# Patient Record
Sex: FEMALE | Race: WHITE | Employment: PART TIME | URBAN - METROPOLITAN AREA
[De-identification: names, ages, dates, MRNs, and addresses within clinical notes are randomized per-mention and may not be internally consistent; named-entity substitution may affect disease eponyms.]

---

## 2017-07-31 ENCOUNTER — HOSPITAL ENCOUNTER (OUTPATIENT)
Dept: WOMENS IMAGING | Age: 62
Discharge: HOME | End: 2017-07-31
Attending: INTERNAL MEDICINE | Admitting: INTERNAL MEDICINE

## 2017-07-31 DIAGNOSIS — Z12.31 VISIT FOR SCREENING MAMMOGRAM: ICD-10-CM

## 2017-09-18 ENCOUNTER — OFFICE VISIT (OUTPATIENT)
Dept: INTERNAL MEDICINE CLINIC | Age: 62
End: 2017-09-18

## 2017-09-18 VITALS
HEIGHT: 65 IN | DIASTOLIC BLOOD PRESSURE: 86 MMHG | HEART RATE: 60 BPM | BODY MASS INDEX: 30.46 KG/M2 | SYSTOLIC BLOOD PRESSURE: 130 MMHG | WEIGHT: 182.8 LBS | RESPIRATION RATE: 16 BRPM

## 2017-09-18 DIAGNOSIS — R73.02 IGT (IMPAIRED GLUCOSE TOLERANCE): Primary | ICD-10-CM

## 2017-09-18 DIAGNOSIS — M79.652 PAIN OF LEFT THIGH: ICD-10-CM

## 2017-09-18 PROCEDURE — 99213 OFFICE O/P EST LOW 20 MIN: CPT | Performed by: INTERNAL MEDICINE

## 2017-09-18 ASSESSMENT — PATIENT HEALTH QUESTIONNAIRE - PHQ9
SUM OF ALL RESPONSES TO PHQ QUESTIONS 1-9: 0
2. FEELING DOWN, DEPRESSED OR HOPELESS: 0
1. LITTLE INTEREST OR PLEASURE IN DOING THINGS: 0
SUM OF ALL RESPONSES TO PHQ9 QUESTIONS 1 & 2: 0

## 2017-09-19 PROBLEM — R73.02 IGT (IMPAIRED GLUCOSE TOLERANCE): Status: ACTIVE | Noted: 2017-09-19

## 2017-09-19 ASSESSMENT — ENCOUNTER SYMPTOMS
RESPIRATORY NEGATIVE: 1
BACK PAIN: 1

## 2018-03-12 LAB
ALBUMIN SERPL-MCNC: 4.2 G/DL
ALP BLD-CCNC: 82 U/L
ALT SERPL-CCNC: 13 U/L
ANION GAP SERPL CALCULATED.3IONS-SCNC: 1.5 MMOL/L
AST SERPL-CCNC: 21 U/L
BASOPHILS ABSOLUTE: NORMAL /ΜL
BASOPHILS RELATIVE PERCENT: NORMAL %
BILIRUB SERPL-MCNC: 0.5 MG/DL (ref 0.1–1.4)
BUN BLDV-MCNC: 10 MG/DL
CALCIUM SERPL-MCNC: 9.5 MG/DL
CHLORIDE BLD-SCNC: 101 MMOL/L
CHOLESTEROL, TOTAL: 166 MG/DL
CHOLESTEROL/HDL RATIO: 3.5
CO2: NORMAL MMOL/L
CREAT SERPL-MCNC: 0.76 MG/DL
EOSINOPHILS ABSOLUTE: NORMAL /ΜL
EOSINOPHILS RELATIVE PERCENT: NORMAL %
GFR CALCULATED: 85
GLUCOSE BLD-MCNC: 82 MG/DL
HCT VFR BLD CALC: 41.6 % (ref 36–46)
HDLC SERPL-MCNC: 48 MG/DL (ref 35–70)
HEMOGLOBIN: 14.1 G/DL (ref 12–16)
IRON: 94
LDL CHOLESTEROL CALCULATED: 95 MG/DL (ref 0–160)
LYMPHOCYTES ABSOLUTE: NORMAL /ΜL
LYMPHOCYTES RELATIVE PERCENT: NORMAL %
MCH RBC QN AUTO: 30.9 PG
MCHC RBC AUTO-ENTMCNC: 33.9 G/DL
MCV RBC AUTO: 91 FL
MONOCYTES ABSOLUTE: NORMAL /ΜL
MONOCYTES RELATIVE PERCENT: NORMAL %
NEUTROPHILS ABSOLUTE: NORMAL /ΜL
NEUTROPHILS RELATIVE PERCENT: NORMAL %
PLATELET # BLD: 227 K/ΜL
PMV BLD AUTO: NORMAL FL
POTASSIUM SERPL-SCNC: 4.7 MMOL/L
RBC # BLD: 4.56 10^6/ΜL
SODIUM BLD-SCNC: 143 MMOL/L
TOTAL PROTEIN: 7
TRIGL SERPL-MCNC: 117 MG/DL
TSH SERPL DL<=0.05 MIU/L-ACNC: 5.95 UIU/ML
URIC ACID: 4.6
VLDLC SERPL CALC-MCNC: 23 MG/DL
WBC # BLD: 4.7 10^3/ML

## 2018-04-23 ENCOUNTER — OFFICE VISIT (OUTPATIENT)
Dept: INTERNAL MEDICINE CLINIC | Age: 63
End: 2018-04-23

## 2018-04-23 VITALS
BODY MASS INDEX: 29.12 KG/M2 | HEART RATE: 54 BPM | DIASTOLIC BLOOD PRESSURE: 70 MMHG | HEIGHT: 65 IN | RESPIRATION RATE: 16 BRPM | SYSTOLIC BLOOD PRESSURE: 132 MMHG | OXYGEN SATURATION: 98 % | WEIGHT: 174.8 LBS

## 2018-04-23 DIAGNOSIS — Z12.11 COLON CANCER SCREENING: Primary | ICD-10-CM

## 2018-04-23 DIAGNOSIS — M54.32 SCIATICA, LEFT SIDE: ICD-10-CM

## 2018-04-23 PROCEDURE — 99213 OFFICE O/P EST LOW 20 MIN: CPT | Performed by: INTERNAL MEDICINE

## 2018-04-23 ASSESSMENT — PATIENT HEALTH QUESTIONNAIRE - PHQ9
2. FEELING DOWN, DEPRESSED OR HOPELESS: 0
1. LITTLE INTEREST OR PLEASURE IN DOING THINGS: 0
SUM OF ALL RESPONSES TO PHQ QUESTIONS 1-9: 0
SUM OF ALL RESPONSES TO PHQ9 QUESTIONS 1 & 2: 0

## 2018-04-23 ASSESSMENT — ENCOUNTER SYMPTOMS: GASTROINTESTINAL NEGATIVE: 1

## 2018-09-07 ENCOUNTER — OFFICE VISIT (OUTPATIENT)
Dept: INTERNAL MEDICINE CLINIC | Age: 63
End: 2018-09-07

## 2018-09-07 VITALS
DIASTOLIC BLOOD PRESSURE: 80 MMHG | BODY MASS INDEX: 29.32 KG/M2 | WEIGHT: 176 LBS | SYSTOLIC BLOOD PRESSURE: 130 MMHG | TEMPERATURE: 98.4 F | HEIGHT: 65 IN | OXYGEN SATURATION: 98 % | HEART RATE: 62 BPM | RESPIRATION RATE: 12 BRPM

## 2018-09-07 DIAGNOSIS — G89.29 CHRONIC LEFT-SIDED LOW BACK PAIN WITH LEFT-SIDED SCIATICA: Primary | ICD-10-CM

## 2018-09-07 DIAGNOSIS — M54.42 CHRONIC LEFT-SIDED LOW BACK PAIN WITH LEFT-SIDED SCIATICA: Primary | ICD-10-CM

## 2018-09-07 PROCEDURE — 99213 OFFICE O/P EST LOW 20 MIN: CPT | Performed by: NURSE PRACTITIONER

## 2018-09-07 RX ORDER — CYCLOBENZAPRINE HCL 10 MG
10 TABLET ORAL 3 TIMES DAILY PRN
Qty: 20 TABLET | Refills: 0 | Status: SHIPPED | OUTPATIENT
Start: 2018-09-07 | End: 2018-09-14

## 2018-09-07 ASSESSMENT — ENCOUNTER SYMPTOMS
COUGH: 0
SINUS PAIN: 0
ABDOMINAL PAIN: 0
CHEST TIGHTNESS: 0
SINUS PRESSURE: 0
BOWEL INCONTINENCE: 0
CHOKING: 0
ABDOMINAL DISTENTION: 0
BACK PAIN: 1
RHINORRHEA: 0
SORE THROAT: 0

## 2018-09-07 NOTE — PROGRESS NOTES
Pt is here for:     Chief Complaint   Patient presents with    Leg Pain     left leg pain; continuing thing that she has seen Dr Serg Joseph for. thinks it might be sciatica but it is not in her back. no rash       This is a 66 yr old CF, with a known past medical hx that includes Chronic back pain per patient reports, that presents today with acute on chronic left thoracic back pain, that radiates down her left thigh. Patient states that s/s are worse at night and that she has been taking NSAIDS with intermittent relief, but states, \"I don't want to keep relying on them\". In any event, patient states that she has d/w PCP the need for possible PT when the s/s \"get worse\". Today, she denies any recent trauma, perianal numbness/tinglings, paraesthesia, urinary/bowel hesitancy/urgency/incontnence. Patient states that she is going on vacation in 2 weeks and knows that she needs to feel \"back to normal\" before that. Patient denies thigh swelling, dysuria, fevers, chills, cough, SOB, or CP. No other acute concerns at this time. Back Pain   This is a chronic problem. The current episode started more than 1 year ago. The problem occurs intermittently. The problem has been gradually worsening since onset. The pain is present in the lumbar spine. The quality of the pain is described as shooting. The pain radiates to the left thigh. The pain is at a severity of 5/10. The pain is mild. The pain is worse during the night. The symptoms are aggravated by lying down and position. Stiffness is present at night. Pertinent negatives include no abdominal pain, bladder incontinence, bowel incontinence, chest pain, dysuria, fever, headaches, leg pain, numbness, paresis, paresthesias, pelvic pain, perianal numbness, tingling, weakness or weight loss. She has tried NSAIDs for the symptoms. The treatment provided mild relief. All questions addressed and answered. No past medical history on file.     No past surgical history on file.C    No Known Allergies    Outpatient Prescriptions Marked as Taking for the 9/7/18 encounter (Office Visit) with YOHANNES Melendez CNP   Medication Sig Dispense Refill    cyclobenzaprine (FLEXERIL) 10 MG tablet Take 1 tablet by mouth 3 times daily as needed for Muscle spasms 20 tablet 0    metroNIDAZOLE (METROCREAM) 0.75 % cream Apply topically 2 times daily Apply topically 2 times daily.  Cetirizine HCl (ZYRTEC ALLERGY PO) Take by mouth         No family history on file. Social History     Social History    Marital status:      Spouse name: N/A    Number of children: N/A    Years of education: N/A     Occupational History    Not on file. Social History Main Topics    Smoking status: Never Smoker    Smokeless tobacco: Never Used    Alcohol use No    Drug use: Unknown    Sexual activity: Not on file     Other Topics Concern    Not on file     Social History Narrative    No narrative on file       Review of Systems   Constitutional: Negative for activity change, appetite change, fever and weight loss. HENT: Negative for congestion, rhinorrhea, sinus pain, sinus pressure, sneezing and sore throat. Respiratory: Negative for cough, choking and chest tightness. Cardiovascular: Negative for chest pain. Gastrointestinal: Negative for abdominal distention, abdominal pain and bowel incontinence. Genitourinary: Negative for bladder incontinence, dysuria, flank pain and pelvic pain. Musculoskeletal: Positive for back pain (left lower thoracic, acute on chronic ) and gait problem (2/2 to left lower back, left thigh sciatica ). Negative for neck pain and neck stiffness. Neurological: Negative for dizziness, tingling, tremors, weakness, numbness, headaches and paresthesias. Psychiatric/Behavioral: Negative for agitation and confusion.        Physical Exam   Nursing note reviewed  /80 (Site: Right Upper Arm, Position: Sitting, Cuff Size: Medium Adult)

## 2018-12-13 ENCOUNTER — OFFICE VISIT (OUTPATIENT)
Dept: INTERNAL MEDICINE CLINIC | Age: 63
End: 2018-12-13
Payer: COMMERCIAL

## 2018-12-13 VITALS
DIASTOLIC BLOOD PRESSURE: 82 MMHG | HEART RATE: 59 BPM | BODY MASS INDEX: 30.08 KG/M2 | OXYGEN SATURATION: 99 % | WEIGHT: 178 LBS | SYSTOLIC BLOOD PRESSURE: 136 MMHG | TEMPERATURE: 98 F

## 2018-12-13 DIAGNOSIS — M54.41 ACUTE BILATERAL LOW BACK PAIN WITH BILATERAL SCIATICA: Primary | ICD-10-CM

## 2018-12-13 DIAGNOSIS — M54.42 ACUTE BILATERAL LOW BACK PAIN WITH BILATERAL SCIATICA: Primary | ICD-10-CM

## 2018-12-13 PROCEDURE — 99214 OFFICE O/P EST MOD 30 MIN: CPT | Performed by: INTERNAL MEDICINE

## 2018-12-13 RX ORDER — DICLOFENAC SODIUM 75 MG/1
75 TABLET, DELAYED RELEASE ORAL 2 TIMES DAILY
Qty: 30 TABLET | Refills: 3 | Status: SHIPPED | OUTPATIENT
Start: 2018-12-13 | End: 2019-09-17 | Stop reason: ALTCHOICE

## 2018-12-13 RX ORDER — METHYLPREDNISOLONE 4 MG/1
TABLET ORAL
Qty: 1 KIT | Refills: 0 | Status: SHIPPED | OUTPATIENT
Start: 2018-12-13 | End: 2019-09-17 | Stop reason: ALTCHOICE

## 2018-12-13 RX ORDER — METAXALONE 800 MG/1
800 TABLET ORAL 3 TIMES DAILY
Qty: 30 TABLET | Refills: 0 | Status: SHIPPED | OUTPATIENT
Start: 2018-12-13 | End: 2018-12-23

## 2018-12-13 NOTE — PROGRESS NOTES
Subjective  Back Pain: Patient presents for presents evaluation of low back problems. Symptoms have been present for 2 days and include lower back sharp and constant pain which started when she woke up, no specific triggers, radiates to the legs, . Pain radiates to the lower extremities. Initial inciting event: none. Symptoms are worst: morning, mid-day, afternoon, evening, nighttime. Alleviating factors identifiable by patient are none. No bladder or bowel incontinence. Some paresthesias reported in he lower extremities. Exacerbating factors identifiable by patient are bending backwards, bending forwards, bending sideways, running, sitting, standing and walking. Treatments so far initiated by patient: none Previous lower back problems: chronic lumbar spine issues, . Previous workup: none. Previous treatments: none. No Known Allergies   Objective  /82 (Site: Right Upper Arm, Position: Sitting, Cuff Size: Medium Adult)   Pulse 59   Temp 98 °F (36.7 °C) (Oral)   Wt 178 lb (80.7 kg)   LMP  (LMP Unknown)   SpO2 99%   BMI 30.08 kg/m²    Afebrile. Cardiovascular exam normal. Lungs are clear. Abdominal exam reveals no flank tenderness. Back exam shows paraspinal muscle tenderness in the lower lumbar region on both sides. There is decreased range of motion in flexion. Lateral movement and rotation of the spine are intact. Straight leg raising is positive bilaterally. No sensory or motor deficit is noted. Deep tendon reflexes are 2+ and equal bilaterally. Assessment  Low back pain  Lumbosacral radiculopathy. Plan  Explained to the patient the possible causes of lumbar radiculopathy, including sciatica and disc herniations. Conservative treatment is recommended initially. Back stretching was reviewed with the patient, and will be performed twice daily. Moist heat locally. Back protective techniques reviewed, along with sleep positioning. Start antiinflammatories, muscle relaxants and pain medications.

## 2019-09-17 ENCOUNTER — OFFICE VISIT (OUTPATIENT)
Dept: INTERNAL MEDICINE CLINIC | Age: 64
End: 2019-09-17
Payer: COMMERCIAL

## 2019-09-17 VITALS
DIASTOLIC BLOOD PRESSURE: 79 MMHG | HEART RATE: 56 BPM | OXYGEN SATURATION: 97 % | BODY MASS INDEX: 30.18 KG/M2 | SYSTOLIC BLOOD PRESSURE: 122 MMHG | RESPIRATION RATE: 16 BRPM | WEIGHT: 178.6 LBS

## 2019-09-17 DIAGNOSIS — Z76.89 ENCOUNTER TO ESTABLISH CARE: Primary | ICD-10-CM

## 2019-09-17 DIAGNOSIS — J30.2 SEASONAL ALLERGIES: ICD-10-CM

## 2019-09-17 DIAGNOSIS — R79.89 ELEVATED TSH: ICD-10-CM

## 2019-09-17 DIAGNOSIS — Z13.820 OSTEOPOROSIS SCREENING: ICD-10-CM

## 2019-09-17 DIAGNOSIS — K58.0 IRRITABLE BOWEL SYNDROME WITH DIARRHEA: ICD-10-CM

## 2019-09-17 DIAGNOSIS — Z12.11 COLON CANCER SCREENING: ICD-10-CM

## 2019-09-17 LAB
T4 FREE: 1.1 NG/DL (ref 0.9–1.8)
TSH REFLEX FT4: 4.27 UIU/ML (ref 0.27–4.2)
VITAMIN D 25-HYDROXY: 24.2 NG/ML

## 2019-09-17 PROCEDURE — 99215 OFFICE O/P EST HI 40 MIN: CPT | Performed by: NURSE PRACTITIONER

## 2019-09-17 RX ORDER — DICYCLOMINE HCL 20 MG
20 TABLET ORAL EVERY 6 HOURS
Qty: 56 TABLET | Refills: 0 | Status: SHIPPED | OUTPATIENT
Start: 2019-09-17 | End: 2020-06-18 | Stop reason: ALTCHOICE

## 2019-09-17 ASSESSMENT — ENCOUNTER SYMPTOMS
COLOR CHANGE: 0
SINUS PAIN: 0
SORE THROAT: 0
ABDOMINAL DISTENTION: 1
EYE PAIN: 0
PHOTOPHOBIA: 0
COUGH: 0
BACK PAIN: 0
NAUSEA: 0
TROUBLE SWALLOWING: 0
DIARRHEA: 1
ABDOMINAL PAIN: 0
CHEST TIGHTNESS: 0
CONSTIPATION: 0
RHINORRHEA: 0
WHEEZING: 0
VOMITING: 0
SHORTNESS OF BREATH: 0
SINUS PRESSURE: 0

## 2019-09-17 ASSESSMENT — PATIENT HEALTH QUESTIONNAIRE - PHQ9
SUM OF ALL RESPONSES TO PHQ QUESTIONS 1-9: 0
SUM OF ALL RESPONSES TO PHQ9 QUESTIONS 1 & 2: 0
1. LITTLE INTEREST OR PLEASURE IN DOING THINGS: 0
2. FEELING DOWN, DEPRESSED OR HOPELESS: 0
SUM OF ALL RESPONSES TO PHQ QUESTIONS 1-9: 0

## 2019-09-17 NOTE — PROGRESS NOTES
Gets together: Not on file     Attends [employer] service: Not on file     Active member of club or organization: Not on file     Attends meetings of clubs or organizations: Not on file     Relationship status: Not on file    Intimate partner violence:     Fear of current or ex partner: Not on file     Emotionally abused: Not on file     Physically abused: Not on file     Forced sexual activity: Not on file   Other Topics Concern    Not on file   Social History Narrative    Not on file       Review of Systems   Constitutional: Negative for activity change, appetite change, chills, fatigue, fever and unexpected weight change. HENT: Negative for congestion, hearing loss, nosebleeds, postnasal drip, rhinorrhea, sinus pressure, sinus pain, sneezing, sore throat and trouble swallowing. Eyes: Negative for photophobia and pain. Respiratory: Negative for cough, chest tightness, shortness of breath and wheezing. Cardiovascular: Negative for chest pain, palpitations and leg swelling. Gastrointestinal: Positive for abdominal distention and diarrhea. Negative for abdominal pain, constipation, nausea and vomiting. Intermittent diarrhea/bloating with exacerbation of IBS s/s    Endocrine: Negative for polydipsia, polyphagia and polyuria. Genitourinary: Negative for difficulty urinating, dysuria and hematuria. Musculoskeletal: Negative for arthralgias, back pain and neck pain. Skin: Negative for color change, pallor and rash. Neurological: Negative for dizziness, tremors, syncope, weakness, numbness and headaches. Hematological: Negative for adenopathy. Psychiatric/Behavioral: Negative for agitation, hallucinations, self-injury, sleep disturbance and suicidal ideas. The patient is not nervous/anxious and is not hyperactive.         Physical Exam   Nursing note reviewed  LMP  (LMP Unknown)   BP Readings from Last 3 Encounters:   12/13/18 136/82   09/07/18 130/80   04/23/18 132/70     Wt Readings

## 2019-10-01 ENCOUNTER — OFFICE VISIT (OUTPATIENT)
Dept: INTERNAL MEDICINE CLINIC | Age: 64
End: 2019-10-01
Payer: COMMERCIAL

## 2019-10-01 VITALS
DIASTOLIC BLOOD PRESSURE: 73 MMHG | RESPIRATION RATE: 16 BRPM | WEIGHT: 178.4 LBS | TEMPERATURE: 98.3 F | HEIGHT: 65 IN | HEART RATE: 57 BPM | SYSTOLIC BLOOD PRESSURE: 115 MMHG | BODY MASS INDEX: 29.72 KG/M2 | OXYGEN SATURATION: 99 %

## 2019-10-01 DIAGNOSIS — Z23 NEED FOR PROPHYLACTIC VACCINATION AGAINST DIPHTHERIA-TETANUS-PERTUSSIS (DTP): ICD-10-CM

## 2019-10-01 DIAGNOSIS — Z23 NEED FOR PROPHYLACTIC VACCINATION AND INOCULATION AGAINST VARICELLA: ICD-10-CM

## 2019-10-01 DIAGNOSIS — Z12.31 ENCOUNTER FOR SCREENING MAMMOGRAM FOR BREAST CANCER: ICD-10-CM

## 2019-10-01 DIAGNOSIS — Z00.00 PHYSICAL EXAM: Primary | ICD-10-CM

## 2019-10-01 DIAGNOSIS — Z12.11 SCREENING FOR COLON CANCER: ICD-10-CM

## 2019-10-01 LAB
A/G RATIO: 2 (ref 1.1–2.2)
ALBUMIN SERPL-MCNC: 4.5 G/DL (ref 3.4–5)
ALP BLD-CCNC: 79 U/L (ref 40–129)
ALT SERPL-CCNC: 14 U/L (ref 10–40)
ANION GAP SERPL CALCULATED.3IONS-SCNC: 15 MMOL/L (ref 3–16)
AST SERPL-CCNC: 17 U/L (ref 15–37)
BILIRUB SERPL-MCNC: 0.4 MG/DL (ref 0–1)
BUN BLDV-MCNC: 9 MG/DL (ref 7–20)
CALCIUM SERPL-MCNC: 9.4 MG/DL (ref 8.3–10.6)
CHLORIDE BLD-SCNC: 104 MMOL/L (ref 99–110)
CHOLESTEROL, TOTAL: 161 MG/DL (ref 0–199)
CO2: 22 MMOL/L (ref 21–32)
CREAT SERPL-MCNC: 0.6 MG/DL (ref 0.6–1.2)
GFR AFRICAN AMERICAN: >60
GFR NON-AFRICAN AMERICAN: >60
GLOBULIN: 2.3 G/DL
GLUCOSE BLD-MCNC: 93 MG/DL (ref 70–99)
HCT VFR BLD CALC: 41 % (ref 36–48)
HDLC SERPL-MCNC: 50 MG/DL (ref 40–60)
HEMOGLOBIN: 13.9 G/DL (ref 12–16)
LDL CHOLESTEROL CALCULATED: 84 MG/DL
MCH RBC QN AUTO: 30.8 PG (ref 26–34)
MCHC RBC AUTO-ENTMCNC: 33.9 G/DL (ref 31–36)
MCV RBC AUTO: 90.8 FL (ref 80–100)
PDW BLD-RTO: 13.2 % (ref 12.4–15.4)
PLATELET # BLD: 216 K/UL (ref 135–450)
PMV BLD AUTO: 9.6 FL (ref 5–10.5)
POTASSIUM SERPL-SCNC: 4.2 MMOL/L (ref 3.5–5.1)
RBC # BLD: 4.52 M/UL (ref 4–5.2)
SODIUM BLD-SCNC: 141 MMOL/L (ref 136–145)
TOTAL PROTEIN: 6.8 G/DL (ref 6.4–8.2)
TRIGL SERPL-MCNC: 135 MG/DL (ref 0–150)
VLDLC SERPL CALC-MCNC: 27 MG/DL
WBC # BLD: 4.1 K/UL (ref 4–11)

## 2019-10-01 PROCEDURE — 99396 PREV VISIT EST AGE 40-64: CPT | Performed by: NURSE PRACTITIONER

## 2019-10-01 PROCEDURE — 36415 COLL VENOUS BLD VENIPUNCTURE: CPT | Performed by: NURSE PRACTITIONER

## 2019-10-01 ASSESSMENT — ENCOUNTER SYMPTOMS
PHOTOPHOBIA: 0
WHEEZING: 0
CHEST TIGHTNESS: 0
ABDOMINAL PAIN: 0
SINUS PRESSURE: 0
TROUBLE SWALLOWING: 0
SINUS PAIN: 0
SHORTNESS OF BREATH: 0
NAUSEA: 0
COLOR CHANGE: 0
BACK PAIN: 0
EYE PAIN: 0
COUGH: 0
CONSTIPATION: 0
RHINORRHEA: 0
SORE THROAT: 0
DIARRHEA: 0
VOMITING: 0

## 2019-10-02 LAB
HIV AG/AB: NORMAL
HIV ANTIGEN: NORMAL
HIV-1 ANTIBODY: NORMAL
HIV-2 AB: NORMAL

## 2019-11-27 ENCOUNTER — TELEPHONE (OUTPATIENT)
Dept: INTERNAL MEDICINE CLINIC | Age: 64
End: 2019-11-27

## 2019-12-16 ENCOUNTER — HOSPITAL ENCOUNTER (OUTPATIENT)
Dept: WOMENS IMAGING | Age: 64
Discharge: HOME | End: 2019-12-16
Payer: COMMERCIAL

## 2019-12-16 DIAGNOSIS — Z00.00 PHYSICAL EXAM: ICD-10-CM

## 2019-12-16 PROCEDURE — 77063 BREAST TOMOSYNTHESIS BI: CPT

## 2019-12-19 ENCOUNTER — TELEPHONE (OUTPATIENT)
Dept: INTERNAL MEDICINE CLINIC | Age: 64
End: 2019-12-19

## 2020-03-13 RX ORDER — CYCLOBENZAPRINE HCL 10 MG
10 TABLET ORAL 3 TIMES DAILY PRN
Qty: 20 TABLET | Refills: 0 | Status: SHIPPED | OUTPATIENT
Start: 2020-03-13 | End: 2021-09-07 | Stop reason: SDUPTHER

## 2020-06-18 ENCOUNTER — OFFICE VISIT (OUTPATIENT)
Dept: INTERNAL MEDICINE CLINIC | Age: 65
End: 2020-06-18
Payer: COMMERCIAL

## 2020-06-18 VITALS
HEART RATE: 65 BPM | SYSTOLIC BLOOD PRESSURE: 114 MMHG | RESPIRATION RATE: 16 BRPM | BODY MASS INDEX: 30.39 KG/M2 | WEIGHT: 182.4 LBS | OXYGEN SATURATION: 95 % | TEMPERATURE: 97.9 F | DIASTOLIC BLOOD PRESSURE: 68 MMHG | HEIGHT: 65 IN

## 2020-06-18 LAB
A/G RATIO: 1.9 (ref 1.1–2.2)
ALBUMIN SERPL-MCNC: 4.5 G/DL (ref 3.4–5)
ALP BLD-CCNC: 82 U/L (ref 40–129)
ALT SERPL-CCNC: 17 U/L (ref 10–40)
ANION GAP SERPL CALCULATED.3IONS-SCNC: 14 MMOL/L (ref 3–16)
AST SERPL-CCNC: 23 U/L (ref 15–37)
BILIRUB SERPL-MCNC: 0.6 MG/DL (ref 0–1)
BUN BLDV-MCNC: 10 MG/DL (ref 7–20)
CALCIUM SERPL-MCNC: 9.6 MG/DL (ref 8.3–10.6)
CHLORIDE BLD-SCNC: 102 MMOL/L (ref 99–110)
CHOLESTEROL, TOTAL: 160 MG/DL (ref 0–199)
CO2: 23 MMOL/L (ref 21–32)
CREAT SERPL-MCNC: 0.6 MG/DL (ref 0.6–1.2)
GFR AFRICAN AMERICAN: >60
GFR NON-AFRICAN AMERICAN: >60
GLOBULIN: 2.4 G/DL
GLUCOSE BLD-MCNC: 94 MG/DL (ref 70–99)
HCT VFR BLD CALC: 44.4 % (ref 36–48)
HDLC SERPL-MCNC: 47 MG/DL (ref 40–60)
HEMOGLOBIN: 14.9 G/DL (ref 12–16)
HEPATITIS C ANTIBODY INTERPRETATION: NORMAL
LDL CHOLESTEROL CALCULATED: 89 MG/DL
MCH RBC QN AUTO: 30.7 PG (ref 26–34)
MCHC RBC AUTO-ENTMCNC: 33.5 G/DL (ref 31–36)
MCV RBC AUTO: 91.8 FL (ref 80–100)
PDW BLD-RTO: 13.3 % (ref 12.4–15.4)
PLATELET # BLD: 231 K/UL (ref 135–450)
PMV BLD AUTO: 9.5 FL (ref 5–10.5)
POTASSIUM SERPL-SCNC: 4.5 MMOL/L (ref 3.5–5.1)
RBC # BLD: 4.84 M/UL (ref 4–5.2)
SODIUM BLD-SCNC: 139 MMOL/L (ref 136–145)
T4 FREE: 1.2 NG/DL (ref 0.9–1.8)
TOTAL PROTEIN: 6.9 G/DL (ref 6.4–8.2)
TRIGL SERPL-MCNC: 121 MG/DL (ref 0–150)
TSH REFLEX FT4: 4.52 UIU/ML (ref 0.27–4.2)
VITAMIN D 25-HYDROXY: 53 NG/ML
VLDLC SERPL CALC-MCNC: 24 MG/DL
WBC # BLD: 4.3 K/UL (ref 4–11)

## 2020-06-18 PROCEDURE — 99396 PREV VISIT EST AGE 40-64: CPT | Performed by: NURSE PRACTITIONER

## 2020-06-18 PROCEDURE — 99213 OFFICE O/P EST LOW 20 MIN: CPT | Performed by: NURSE PRACTITIONER

## 2020-06-18 PROCEDURE — 36415 COLL VENOUS BLD VENIPUNCTURE: CPT | Performed by: NURSE PRACTITIONER

## 2020-06-18 RX ORDER — DOXYCYCLINE 100 MG/1
200 TABLET ORAL ONCE
Qty: 2 TABLET | Refills: 0 | Status: SHIPPED | OUTPATIENT
Start: 2020-06-18 | End: 2020-06-18

## 2020-06-18 ASSESSMENT — ENCOUNTER SYMPTOMS
WHEEZING: 0
DIARRHEA: 0
PHOTOPHOBIA: 0
ABDOMINAL PAIN: 0
SINUS PRESSURE: 0
NAUSEA: 0
BACK PAIN: 0
SORE THROAT: 0
VOMITING: 0
SHORTNESS OF BREATH: 0
CONSTIPATION: 0
TROUBLE SWALLOWING: 0
COUGH: 0
CHEST TIGHTNESS: 0
SINUS PAIN: 0
RHINORRHEA: 0
EYE PAIN: 0
COLOR CHANGE: 0

## 2020-06-18 ASSESSMENT — PATIENT HEALTH QUESTIONNAIRE - PHQ9
SUM OF ALL RESPONSES TO PHQ9 QUESTIONS 1 & 2: 0
SUM OF ALL RESPONSES TO PHQ QUESTIONS 1-9: 0
SUM OF ALL RESPONSES TO PHQ QUESTIONS 1-9: 0
2. FEELING DOWN, DEPRESSED OR HOPELESS: 0
1. LITTLE INTEREST OR PLEASURE IN DOING THINGS: 0

## 2020-06-18 NOTE — PROGRESS NOTES
and leg swelling. Gastrointestinal: Negative for abdominal pain, constipation, diarrhea, nausea and vomiting. Endocrine: Negative for polydipsia, polyphagia and polyuria. Genitourinary: Negative for difficulty urinating, dysuria, hematuria and urgency. Musculoskeletal: Negative for back pain and neck pain. Skin: Negative for color change, pallor and rash. Bite messi to left mid-thoracic region, ~1 cm in diameter, suspected 2/2 to tick bite, pruritic in nature    Neurological: Negative for dizziness, tremors, syncope, weakness, numbness and headaches. Hematological: Negative for adenopathy. Psychiatric/Behavioral: Negative for confusion, decreased concentration, dysphoric mood, hallucinations, self-injury, sleep disturbance and suicidal ideas. The patient is nervous/anxious. The patient is not hyperactive. Physical Exam  Constitutional:       General: She is not in acute distress. Appearance: Normal appearance. She is well-developed. She is not ill-appearing or toxic-appearing. HENT:      Head: Normocephalic. Right Ear: Tympanic membrane is not erythematous. Left Ear: Tympanic membrane is not erythematous. Mouth/Throat:      Mouth: Mucous membranes are dry. Eyes:      General:         Right eye: No discharge. Left eye: No discharge. Pupils: Pupils are equal, round, and reactive to light. Neck:      Musculoskeletal: Normal range of motion. Thyroid: No thyromegaly. Vascular: No carotid bruit or JVD. Trachea: No tracheal deviation. Cardiovascular:      Rate and Rhythm: Normal rate and regular rhythm. Heart sounds: No murmur. Pulmonary:      Effort: No respiratory distress. Breath sounds: Normal breath sounds. No wheezing or rales. Chest:      Chest wall: No tenderness. Abdominal:      General: Bowel sounds are normal. There is no distension. Palpations: Abdomen is soft. There is no mass. Tenderness:  There is no abdominal tenderness. There is no guarding or rebound. Musculoskeletal: Normal range of motion. General: No tenderness. Right lower leg: No edema. Left lower leg: No edema. Lymphadenopathy:      Cervical: No cervical adenopathy. Skin:     General: Skin is warm and dry. Coloration: Skin is not jaundiced. Findings: No bruising, erythema, lesion or rash. Neurological:      General: No focal deficit present. Mental Status: She is alert and oriented to person, place, and time. Mental status is at baseline. Cranial Nerves: No cranial nerve deficit. Motor: No weakness. Gait: Gait normal.      Deep Tendon Reflexes: Reflexes are normal and symmetric. Psychiatric:         Mood and Affect: Mood normal.         Behavior: Behavior normal.         Thought Content: Thought content normal.         Judgment: Judgment normal.          Assessment/Plan   Fredi Alvarez was seen today for annual exam and blood work. Diagnoses and all orders for this visit:    Physical exam  -     VITAMIN D 25 HYDROXY; Future  -     CBC; Future  -     COMPREHENSIVE METABOLIC PANEL; Future  -     TSH WITH REFLEX TO FT4; Future  -     Hemoglobin A1C; Future  -     LIPID PANEL; Future  -     Cancel: POCT Fecal Immunochemical Test (FIT); Future  -     HEPATITIS C ANTIBODY; Future  -     AFL - Padma Guzman MD, Gastroenterology, [facility] SPECIAL[facility]    Tick bite, initial encounter  -     doxycycline monohydrate (ADOXA) 100 MG tablet; Take 2 tablets by mouth once for 1 dose May substitute another form of Doxycycline if insurance requires. Patient instructed should fevers/chills/rash or any other concerning s/s occur, to go to ER ASAP  All questions addressed and answered, patient agrees with plan of care.

## 2020-06-19 LAB
ESTIMATED AVERAGE GLUCOSE: 105.4 MG/DL
HBA1C MFR BLD: 5.3 %

## 2020-07-30 ENCOUNTER — NURSE ONLY (OUTPATIENT)
Dept: INTERNAL MEDICINE CLINIC | Age: 65
End: 2020-07-30
Payer: COMMERCIAL

## 2020-07-30 PROCEDURE — 90471 IMMUNIZATION ADMIN: CPT | Performed by: NURSE PRACTITIONER

## 2020-07-30 PROCEDURE — 90750 HZV VACC RECOMBINANT IM: CPT | Performed by: NURSE PRACTITIONER

## 2020-12-02 LAB — PAP SMEAR, EXTERNAL: NEGATIVE

## 2020-12-07 ENCOUNTER — OFFICE VISIT (OUTPATIENT)
Dept: PRIMARY CARE CLINIC | Age: 65
End: 2020-12-07
Payer: COMMERCIAL

## 2020-12-07 PROCEDURE — 99211 OFF/OP EST MAY X REQ PHY/QHP: CPT | Performed by: NURSE PRACTITIONER

## 2020-12-07 NOTE — PROGRESS NOTES
Thang Martinez received a viral test for COVID-19. They were educated on isolation and quarantine as appropriate. For any symptoms, they were directed to seek care from their PCP, given contact information to establish with a doctor, directed to an urgent care or the emergency room.

## 2020-12-08 LAB — SARS-COV-2, NAA: DETECTED

## 2020-12-22 ENCOUNTER — OFFICE VISIT (OUTPATIENT)
Dept: INTERNAL MEDICINE CLINIC | Age: 65
End: 2020-12-22
Payer: COMMERCIAL

## 2020-12-22 VITALS
RESPIRATION RATE: 12 BRPM | BODY MASS INDEX: 28.45 KG/M2 | SYSTOLIC BLOOD PRESSURE: 128 MMHG | WEIGHT: 177 LBS | TEMPERATURE: 97.6 F | DIASTOLIC BLOOD PRESSURE: 79 MMHG | OXYGEN SATURATION: 99 % | HEIGHT: 66 IN | HEART RATE: 67 BPM

## 2020-12-22 LAB — TSH REFLEX: 2.44 UIU/ML (ref 0.27–4.2)

## 2020-12-22 PROCEDURE — 90750 HZV VACC RECOMBINANT IM: CPT | Performed by: NURSE PRACTITIONER

## 2020-12-22 PROCEDURE — 90471 IMMUNIZATION ADMIN: CPT | Performed by: NURSE PRACTITIONER

## 2020-12-22 PROCEDURE — 99214 OFFICE O/P EST MOD 30 MIN: CPT | Performed by: NURSE PRACTITIONER

## 2020-12-22 RX ORDER — FAMOTIDINE 20 MG
1 TABLET ORAL DAILY
COMMUNITY

## 2020-12-22 SDOH — ECONOMIC STABILITY: TRANSPORTATION INSECURITY
IN THE PAST 12 MONTHS, HAS LACK OF TRANSPORTATION KEPT YOU FROM MEETINGS, WORK, OR FROM GETTING THINGS NEEDED FOR DAILY LIVING?: NO

## 2020-12-22 SDOH — ECONOMIC STABILITY: INCOME INSECURITY: HOW HARD IS IT FOR YOU TO PAY FOR THE VERY BASICS LIKE FOOD, HOUSING, MEDICAL CARE, AND HEATING?: NOT HARD AT ALL

## 2020-12-22 SDOH — ECONOMIC STABILITY: TRANSPORTATION INSECURITY
IN THE PAST 12 MONTHS, HAS THE LACK OF TRANSPORTATION KEPT YOU FROM MEDICAL APPOINTMENTS OR FROM GETTING MEDICATIONS?: NO

## 2020-12-22 SDOH — ECONOMIC STABILITY: FOOD INSECURITY: WITHIN THE PAST 12 MONTHS, YOU WORRIED THAT YOUR FOOD WOULD RUN OUT BEFORE YOU GOT MONEY TO BUY MORE.: NEVER TRUE

## 2020-12-22 SDOH — ECONOMIC STABILITY: FOOD INSECURITY: WITHIN THE PAST 12 MONTHS, THE FOOD YOU BOUGHT JUST DIDN'T LAST AND YOU DIDN'T HAVE MONEY TO GET MORE.: NEVER TRUE

## 2020-12-22 ASSESSMENT — ENCOUNTER SYMPTOMS
CHEST TIGHTNESS: 0
TROUBLE SWALLOWING: 0
ABDOMINAL PAIN: 0
CONSTIPATION: 0
DIARRHEA: 0
COUGH: 0
SHORTNESS OF BREATH: 0
NAUSEA: 0
VOMITING: 0
WHEEZING: 0

## 2021-01-22 NOTE — PROGRESS NOTES
[facility] [facility] [place] time_______1/29/2021 0700_____        Surgery time___0800_________    Take the following medications with a sip of water:    Do not eat or drink anything after 12:00 midnight prior to your surgery. This includes water chewing gum, mints and ice chips. You may brush your teeth and gargle the morning of your surgery, but do not swallow the water     Please see your family doctor/pediatrician for a history and physical and/or concerning medications. Bring any test results/reports from your physicians office. If you are under the care of a heart doctor or specialist doctor, please be aware that you may be asked to them for clearance    You may be asked to stop blood thinners such as Coumadin, Plavix, Fragmin, Lovenox, etc., or any anti-inflammatories such as:  Aspirin, Ibuprofen, Advil, Naproxen prior to your surgery. We also ask that you stop any OTC medications such as fish oil, vitamin E, glucosamine, garlic, Multivitamins, COQ 10, etc.    We ask that you do not smoke 24 hours prior to surgery  We ask that you do not  drink any alcoholic beverages 24 hours prior to surgery     You must make arrangements for a responsible adult to take you home after your surgery. For your safety you will not be allowed to leave alone or drive yourself home. Your surgery will be cancelled if you do not have a ride home. Also for your safety, it is strongly suggested that someone stay with you the first 24 hours after your surgery. A parent or legal guardian must accompany a child scheduled for surgery and plan to stay at the hospital until the child is discharged. Please do not bring other children with you. For your comfort, please wear simple loose fitting clothing to the hospital.  Please do not bring valuables.     Do not wear any make-up or nail polish on your fingers or toes      For your safety, please do not wear any jewelry or body piercing's on the day of surgery. All jewelry must be removed. If you have dentures, they will be removed before going to operating room. For your convenience, we will provide you with a container. If you wear contact lenses or glasses, they will be removed, please bring a case for them. If you have a living will and a durable power of  for healthcare, please bring in a copy. As part of our patient safety program to minimize surgical site infections, we ask you to do the following:    · Please notify your surgeon if you develop any illness between         now and the  day of your surgery. · This includes a cough, cold, fever, sore throat, nausea,         or vomiting, and diarrhea, etc.  ·  Please notify your surgeon if you experience dizziness, shortness         of breath or blurred vision between now and the time of your surgery. Do not shave your operative site 96 hours prior to surgery. For face and neck surgery, men may use an electric razor 48 hours   prior to surgery. You may shower the night before surgery or the morning of   your surgery with an antibacterial soap. You will need to bring a photo ID and insurance card    [facility] has an onsite pharmacy, would you like to utilize our pharmacy     If you will be staying overnight and use a C-pap machine, please bring   your C-pap to hospital     Our goal is to provide you with excellent care, therefore, visitors will be limited to two(2) in the room at a time so that we may focus on providing this care for you. Please contact pre-admission testing if you have any further questions. [facility] phone number:  [phone]     Please note these are generalized instructions for all surgical cases, you may be provided with more specific instructions according to your surgery.

## 2021-01-28 ENCOUNTER — ANESTHESIA EVENT (OUTPATIENT)
Dept: ENDOSCOPY | Age: 66
End: 2021-01-28
Payer: COMMERCIAL

## 2021-01-29 ENCOUNTER — HOSPITAL ENCOUNTER (OUTPATIENT)
Age: 66
Setting detail: OUTPATIENT SURGERY
Discharge: HOME | End: 2021-01-29
Attending: INTERNAL MEDICINE | Admitting: INTERNAL MEDICINE
Payer: COMMERCIAL

## 2021-01-29 ENCOUNTER — ANESTHESIA (OUTPATIENT)
Dept: ENDOSCOPY | Age: 66
End: 2021-01-29
Payer: COMMERCIAL

## 2021-01-29 VITALS — DIASTOLIC BLOOD PRESSURE: 58 MMHG | OXYGEN SATURATION: 99 % | SYSTOLIC BLOOD PRESSURE: 99 MMHG

## 2021-01-29 VITALS
HEIGHT: 65 IN | SYSTOLIC BLOOD PRESSURE: 127 MMHG | WEIGHT: 173.13 LBS | HEART RATE: 58 BPM | RESPIRATION RATE: 16 BRPM | TEMPERATURE: 96.8 F | OXYGEN SATURATION: 100 % | DIASTOLIC BLOOD PRESSURE: 88 MMHG | BODY MASS INDEX: 28.84 KG/M2

## 2021-01-29 PROCEDURE — 3700000001 HC ADD 15 MINUTES (ANESTHESIA): Performed by: INTERNAL MEDICINE

## 2021-01-29 PROCEDURE — 3609027000 HC COLONOSCOPY: Performed by: INTERNAL MEDICINE

## 2021-01-29 PROCEDURE — 7100000010 HC PHASE II RECOVERY - FIRST 15 MIN: Performed by: INTERNAL MEDICINE

## 2021-01-29 PROCEDURE — 6360000002 HC RX W HCPCS: Performed by: NURSE ANESTHETIST, CERTIFIED REGISTERED

## 2021-01-29 PROCEDURE — 7100000011 HC PHASE II RECOVERY - ADDTL 15 MIN: Performed by: INTERNAL MEDICINE

## 2021-01-29 PROCEDURE — 2500000003 HC RX 250 WO HCPCS: Performed by: NURSE ANESTHETIST, CERTIFIED REGISTERED

## 2021-01-29 PROCEDURE — 3700000000 HC ANESTHESIA ATTENDED CARE: Performed by: INTERNAL MEDICINE

## 2021-01-29 PROCEDURE — 2580000003 HC RX 258: Performed by: ANESTHESIOLOGY

## 2021-01-29 RX ORDER — ONDANSETRON 2 MG/ML
4 INJECTION INTRAMUSCULAR; INTRAVENOUS
Status: DISCONTINUED | OUTPATIENT
Start: 2021-01-29 | End: 2021-01-29 | Stop reason: HOSPADM

## 2021-01-29 RX ORDER — PROMETHAZINE HYDROCHLORIDE 25 MG/ML
6.25 INJECTION, SOLUTION INTRAMUSCULAR; INTRAVENOUS
Status: DISCONTINUED | OUTPATIENT
Start: 2021-01-29 | End: 2021-01-29 | Stop reason: HOSPADM

## 2021-01-29 RX ORDER — PROPOFOL 10 MG/ML
INJECTION, EMULSION INTRAVENOUS PRN
Status: DISCONTINUED | OUTPATIENT
Start: 2021-01-29 | End: 2021-01-29 | Stop reason: SDUPTHER

## 2021-01-29 RX ORDER — SODIUM CHLORIDE 0.9 % (FLUSH) 0.9 %
10 SYRINGE (ML) INJECTION EVERY 12 HOURS SCHEDULED
Status: DISCONTINUED | OUTPATIENT
Start: 2021-01-29 | End: 2021-01-29 | Stop reason: HOSPADM

## 2021-01-29 RX ORDER — LIDOCAINE HYDROCHLORIDE 20 MG/ML
INJECTION, SOLUTION EPIDURAL; INFILTRATION; INTRACAUDAL; PERINEURAL PRN
Status: DISCONTINUED | OUTPATIENT
Start: 2021-01-29 | End: 2021-01-29 | Stop reason: SDUPTHER

## 2021-01-29 RX ORDER — SODIUM CHLORIDE 9 MG/ML
INJECTION, SOLUTION INTRAVENOUS CONTINUOUS
Status: DISCONTINUED | OUTPATIENT
Start: 2021-01-29 | End: 2021-01-29 | Stop reason: HOSPADM

## 2021-01-29 RX ORDER — LABETALOL HYDROCHLORIDE 5 MG/ML
5 INJECTION, SOLUTION INTRAVENOUS EVERY 10 MIN PRN
Status: DISCONTINUED | OUTPATIENT
Start: 2021-01-29 | End: 2021-01-29 | Stop reason: HOSPADM

## 2021-01-29 RX ORDER — SODIUM CHLORIDE 0.9 % (FLUSH) 0.9 %
10 SYRINGE (ML) INJECTION PRN
Status: DISCONTINUED | OUTPATIENT
Start: 2021-01-29 | End: 2021-01-29 | Stop reason: HOSPADM

## 2021-01-29 RX ADMIN — PROPOFOL 50 MG: 10 INJECTION, EMULSION INTRAVENOUS at 07:51

## 2021-01-29 RX ADMIN — PROPOFOL 50 MG: 10 INJECTION, EMULSION INTRAVENOUS at 07:54

## 2021-01-29 RX ADMIN — PROPOFOL 50 MG: 10 INJECTION, EMULSION INTRAVENOUS at 07:50

## 2021-01-29 RX ADMIN — SODIUM CHLORIDE: 9 INJECTION, SOLUTION INTRAVENOUS at 07:22

## 2021-01-29 RX ADMIN — LIDOCAINE HYDROCHLORIDE 100 MG: 20 INJECTION, SOLUTION EPIDURAL; INFILTRATION; INTRACAUDAL; PERINEURAL at 07:50

## 2021-01-29 ASSESSMENT — PAIN SCALES - GENERAL
PAINLEVEL_OUTOF10: 0
PAINLEVEL_OUTOF10: 0

## 2021-01-29 NOTE — PROCEDURES
[facility]  Endoscopy Note    Patient: Mayco Espinoza  : 1955  Acct#: [de-identified]    Procedure: Colonoscopy     Date:  2021    Surgeon:  Yennifer Esparza MD    Referring Physician:  Bertrand Bustos    Previous Colonoscopy: No  Date: na  Greater than 3 years? na    Preoperative Diagnosis:  screening    Postoperative Diagnosis:  Normal colon    Anesthesia:  See anesthesia note    Indications: This is a 63y.o. year old female who presents today with screening for colon cancer. Procedure: An informed consent was obtained from the patient after explanation of indications, benefits, possible risks and complications of the procedure. The patient was then taken to the endoscopy suite, placed in the left lateral decubitus position, and the above IV anesthesia was administered. A digital rectal examination was performed and revealed negative without mass, lesions or tenderness. The Olympus CFQ-180-AL video colonoscope was placed in the patient's rectum under digital direction and advanced to the cecum. The cecum was identified by characteristic anatomy and ballottment. The ileocecal valve was identified. The preparation was excellent. The scope was then withdrawn back through the cecum, ascending, transverse, descending and sigmoid colons. Carefull circumferential examination of the mucosa in these areas demonstrated normal colonic mucosa throughout. The scope was then withdrawn into the rectum and retroflexed. The retroflexed view of the anal verge and rectum demonstrates no abnormalities. The scope was straightened, the colon was decompressed and the scope was withdrawn from the patient. The patient tolerated the procedure well and was taken to the PACU in good condition. Estimated Blood Loss:  none    Impression: Normal colon    Recommendations:  Repeat colonoscopy in 10 years.     Yennifer Esparza MD   [facility]  2021

## 2021-01-29 NOTE — ANESTHESIA POSTPROCEDURE EVALUATION
[facility] Department of Anesthesiology  Post-Anesthesia Note       Name:  Leigh Ann Dotson                                  Age:  63 y.o. MRN:  [id number]     Last Vitals & Oxygen Saturation: /88   Pulse 58   Temp 96.8 °F (36 °C) (Temporal)   Resp 16   Ht 5' 5\" (1.651 m)   Wt 173 lb 2 oz (78.5 kg)   LMP  (LMP Unknown)   SpO2 100%   BMI 28.81 kg/m²   Patient Vitals for the past 4 hrs:   BP Temp Temp src Pulse Resp SpO2 Height Weight   01/29/21 0833 127/88   58 16 100 %     01/29/21 0823 120/70   61 16 99 %     01/29/21 0813 (!) 106/54 96.8 °F (36 °C) Temporal 60 16 98 %     01/29/21 0716 (!) 153/75 98.4 °F (36.9 °C) Temporal 72 16 100 % 5' 5\" (1.651 m) 173 lb 2 oz (78.5 kg)       Level of consciousness:  Awake, alert    Respiratory: Respirations easy, no distress. Stable. Cardiovascular: Hemodynamically stable. Hydration: Adequate. PONV: Adequately managed. Post-op pain: Adequately controlled. Post-op assessment: Tolerated anesthetic well without complication. Complications:  None.     Mitzi Henry MD  January 29, 2021   8:36 AM

## 2021-01-29 NOTE — H&P
[facility]   Pre-operative History and Physical    Patient: Jeni Sherwood  : 1955  Acct#: [de-identified]    History Obtained From: electronic medical record    HISTORY OF PRESENT ILLNESS  Procedure:Colonoscopy  Indications:screening  Past Medical History:        Diagnosis Date    Elevated TSH     History of skin cancer     IBS (irritable bowel syndrome)     Rosacea     Seasonal allergies     Sty      Past Surgical History:        Procedure Laterality Date    SKIN CANCER EXCISION      face, x3     Medications prior to admission:   Prior to Admission medications    Medication Sig Start Date End Date Taking? Authorizing Provider   Vitamin D, Cholecalciferol, 25 MCG (1000 UT) CAPS Take 1 capsule by mouth daily   Yes Historical Provider, MD   metroNIDAZOLE (METROCREAM) 0.75 % cream Apply topically 2 times daily Apply topically 2 times daily. Yes Historical Provider, MD   Cetirizine HCl (ZYRTEC ALLERGY PO) Take 1 tablet by mouth daily     Historical Provider, MD     Allergies:   Patient has no known allergies.     Social History     Socioeconomic History    Marital status:      Spouse name: Not on file    Number of children: Not on file    Years of education: Not on file    Highest education level: Not on file   Occupational History    Occupation:     Social Needs    Financial resource strain: Not hard at all   [place] insecurity     Worry: Never true     Inability: Never true   [employer] needs     Medical: No     Non-medical: No   Tobacco Use    Smoking status: Never Smoker    Smokeless tobacco: Never Used   Substance and Sexual Activity    Alcohol use: No    Drug use: Never    Sexual activity: Not on file     Comment:    Lifestyle    Physical activity     Days per week: Not on file     Minutes per session: Not on file    Stress: Not on file   Relationships    Social connections     Talks on phone: Not on file     Gets together: Not on file     Attends Rastafari service: Not on file     Active member of club or organization: Not on file     Attends meetings of clubs or organizations: Not on file     Relationship status: Not on file    Intimate partner violence     Fear of current or ex partner: Not on file     Emotionally abused: Not on file     Physically abused: Not on file     Forced sexual activity: Not on file   Other Topics Concern    Not on file   Social History Narrative    Not on file     Family History   Problem Relation Age of Onset    Cancer Mother     High Blood Pressure Father     Heart Failure Sister     Stroke Sister     Diabetes Maternal Grandmother          PHYSICAL EXAM:      BP (!) 153/75   Pulse 72   Temp 98.4 °F (36.9 °C) (Temporal)   Resp 16   Ht 5' 5\" (1.651 m)   Wt 173 lb 2 oz (78.5 kg)   LMP  (LMP Unknown)   SpO2 100%   BMI 28.81 kg/m²  I        Heart:normal    Lungs: normal    Abdomen: normal      ASA Grade:  See anesthesia note      ASSESSMENT AND PLAN:    1. Procedure options, risks and benefits reviewed with patient and expresses understanding.

## 2021-01-29 NOTE — ANESTHESIA PRE PROCEDURE
[facility] Department of Anesthesiology  Pre-Anesthesia Evaluation/Consultation       Name:  Sameera Stiles  : 1955  Age:  63 y.o. MRN:  [id number]  Date: 2021           Surgeon: Surgeon(s):  Kody He MD    Procedure: Procedure(s):  COLORECTAL CANCER SCREENING, NOT HIGH RISK     No Known Allergies  Patient Active Problem List   Diagnosis    IGT (impaired glucose tolerance)    Asymptomatic varicose veins    Disease of jaw    History of nonmelanoma skin cancer    Irritable bowel syndrome    Other benign neoplasm of connective and other soft tissue of unspecified site    Rosacea     Past Medical History:   Diagnosis Date    Elevated TSH     History of skin cancer     IBS (irritable bowel syndrome)     Rosacea     Seasonal allergies     Sty      Past Surgical History:   Procedure Laterality Date    SKIN CANCER EXCISION      face, x3     Social History     Tobacco Use    Smoking status: Never Smoker    Smokeless tobacco: Never Used   Substance Use Topics    Alcohol use: No    Drug use: Never     Medications  No current facility-administered medications on file prior to encounter. Current Outpatient Medications on File Prior to Encounter   Medication Sig Dispense Refill    Vitamin D, Cholecalciferol, 25 MCG (1000 UT) CAPS Take 1 capsule by mouth daily      metroNIDAZOLE (METROCREAM) 0.75 % cream Apply topically 2 times daily Apply topically 2 times daily.       Cetirizine HCl (ZYRTEC ALLERGY PO) Take 1 tablet by mouth daily        Current Facility-Administered Medications   Medication Dose Route Frequency Provider Last Rate Last Admin    0.9 % sodium chloride infusion   Intravenous Continuous Emily Lang MD 75 mL/hr at 21 0722 New Bag at 21 0722    sodium chloride flush 0.9 % injection 10 mL  10 mL Intravenous 2 times per day Emily Lang MD CALCIUM 9.6 06/18/2020    GFRAA >60 06/18/2020    LABGLOM >60 06/18/2020    GLUCOSE 94 06/18/2020     POCGlucose  No results for input(s): GLUCOSE in the last 72 hours. Coags  No results found for: PROTIME, INR, APTT  HCG (If Applicable) No results found for: PREGTESTUR, PREGSERUM, HCG, HCGQUANT   ABGs No results found for: PHART, PO2ART, VWJ6TRX, NLT6APD, BEART, O8FMMVXR   Type & Screen (If Applicable)  No results found for: LABABO, LABRH                         BMI: Wt Readings from Last 3 Encounters:       NPO Status:   Date of last liquid consumption: 01/29/21   Time of last liquid consumption: 0030   Date of last solid food consumption: 01/27/21      Time of last solid consumption: 1800       Anesthesia Evaluation  Patient summary reviewed no history of anesthetic complications:   Airway: Mallampati: III  TM distance: >3 FB   Neck ROM: full   Dental: normal exam         Pulmonary:Negative Pulmonary ROS and normal exam                               Cardiovascular:Negative CV ROS  Exercise tolerance: good (>4 METS),           Rhythm: regular  Rate: normal           Beta Blocker:  Not on Beta Blocker         Neuro/Psych:   Negative Neuro/Psych ROS              GI/Hepatic/Renal: Neg GI/Hepatic/Renal ROS  (+) bowel prep,           Endo/Other: Negative Endo/Other ROS                    Abdominal:           Vascular: negative vascular ROS. Anesthesia Plan      MAC     ASA 2       Induction: intravenous. Anesthetic plan and risks discussed with patient. Plan discussed with CRNA.               This pre-anesthesia assessment may be used as a history and physical.    DOS STAFF ADDENDUM:

## 2021-09-07 ENCOUNTER — OFFICE VISIT (OUTPATIENT)
Dept: INTERNAL MEDICINE CLINIC | Age: 66
End: 2021-09-07
Payer: COMMERCIAL

## 2021-09-07 VITALS
DIASTOLIC BLOOD PRESSURE: 62 MMHG | BODY MASS INDEX: 30.66 KG/M2 | RESPIRATION RATE: 17 BRPM | HEIGHT: 65 IN | HEART RATE: 53 BPM | WEIGHT: 184 LBS | SYSTOLIC BLOOD PRESSURE: 125 MMHG | OXYGEN SATURATION: 97 %

## 2021-09-07 DIAGNOSIS — L71.9 ROSACEA: ICD-10-CM

## 2021-09-07 DIAGNOSIS — B37.9 CANDIDIASIS: ICD-10-CM

## 2021-09-07 DIAGNOSIS — Z12.31 ENCOUNTER FOR SCREENING MAMMOGRAM FOR BREAST CANCER: ICD-10-CM

## 2021-09-07 DIAGNOSIS — Z00.00 ENCOUNTER FOR PREVENTATIVE ADULT HEALTH CARE EXAMINATION: Primary | ICD-10-CM

## 2021-09-07 DIAGNOSIS — R60.9 SUBMANDIBULAR GLAND SWELLING: ICD-10-CM

## 2021-09-07 DIAGNOSIS — M54.9 MUSCULOSKELETAL BACK PAIN: ICD-10-CM

## 2021-09-07 PROBLEM — R60.0 SUBMANDIBULAR GLAND SWELLING: Status: ACTIVE | Noted: 2021-09-07

## 2021-09-07 LAB
A/G RATIO: 2 (ref 1.1–2.2)
ALBUMIN SERPL-MCNC: 4.5 G/DL (ref 3.4–5)
ALP BLD-CCNC: 93 U/L (ref 40–129)
ALT SERPL-CCNC: 20 U/L (ref 10–40)
ANION GAP SERPL CALCULATED.3IONS-SCNC: 13 MMOL/L (ref 3–16)
AST SERPL-CCNC: 23 U/L (ref 15–37)
BASOPHILS ABSOLUTE: 0 K/UL (ref 0–0.2)
BASOPHILS RELATIVE PERCENT: 0.7 %
BILIRUB SERPL-MCNC: 0.6 MG/DL (ref 0–1)
BILIRUBIN URINE: NEGATIVE
BLOOD, URINE: NEGATIVE
BUN BLDV-MCNC: 12 MG/DL (ref 7–20)
CALCIUM SERPL-MCNC: 9.5 MG/DL (ref 8.3–10.6)
CHLORIDE BLD-SCNC: 103 MMOL/L (ref 99–110)
CHOLESTEROL, TOTAL: 174 MG/DL (ref 0–199)
CLARITY: CLEAR
CO2: 24 MMOL/L (ref 21–32)
COLOR: YELLOW
CREAT SERPL-MCNC: 0.7 MG/DL (ref 0.6–1.2)
EOSINOPHILS ABSOLUTE: 0.1 K/UL (ref 0–0.6)
EOSINOPHILS RELATIVE PERCENT: 1.2 %
GFR AFRICAN AMERICAN: >60
GFR NON-AFRICAN AMERICAN: >60
GLOBULIN: 2.3 G/DL
GLUCOSE BLD-MCNC: 90 MG/DL (ref 70–99)
GLUCOSE URINE: NEGATIVE MG/DL
HCT VFR BLD CALC: 41.5 % (ref 36–48)
HDLC SERPL-MCNC: 50 MG/DL (ref 40–60)
HEMOGLOBIN: 14.2 G/DL (ref 12–16)
KETONES, URINE: NEGATIVE MG/DL
LDL CHOLESTEROL CALCULATED: 99 MG/DL
LEUKOCYTE ESTERASE, URINE: NEGATIVE
LYMPHOCYTES ABSOLUTE: 1.7 K/UL (ref 1–5.1)
LYMPHOCYTES RELATIVE PERCENT: 38 %
MCH RBC QN AUTO: 31.4 PG (ref 26–34)
MCHC RBC AUTO-ENTMCNC: 34.3 G/DL (ref 31–36)
MCV RBC AUTO: 91.3 FL (ref 80–100)
MICROSCOPIC EXAMINATION: NORMAL
MONOCYTES ABSOLUTE: 0.3 K/UL (ref 0–1.3)
MONOCYTES RELATIVE PERCENT: 6.8 %
NEUTROPHILS ABSOLUTE: 2.4 K/UL (ref 1.7–7.7)
NEUTROPHILS RELATIVE PERCENT: 53.3 %
NITRITE, URINE: NEGATIVE
PDW BLD-RTO: 13.4 % (ref 12.4–15.4)
PH UA: 7.5 (ref 5–8)
PLATELET # BLD: 189 K/UL (ref 135–450)
PMV BLD AUTO: 9.5 FL (ref 5–10.5)
POTASSIUM SERPL-SCNC: 4.3 MMOL/L (ref 3.5–5.1)
PROTEIN UA: NEGATIVE MG/DL
RBC # BLD: 4.54 M/UL (ref 4–5.2)
SODIUM BLD-SCNC: 140 MMOL/L (ref 136–145)
SPECIFIC GRAVITY UA: 1.01 (ref 1–1.03)
TOTAL PROTEIN: 6.8 G/DL (ref 6.4–8.2)
TRIGL SERPL-MCNC: 125 MG/DL (ref 0–150)
URINE TYPE: NORMAL
UROBILINOGEN, URINE: 1 E.U./DL
VLDLC SERPL CALC-MCNC: 25 MG/DL
WBC # BLD: 4.5 K/UL (ref 4–11)

## 2021-09-07 PROCEDURE — 99396 PREV VISIT EST AGE 40-64: CPT | Performed by: INTERNAL MEDICINE

## 2021-09-07 PROCEDURE — 81003 URINALYSIS AUTO W/O SCOPE: CPT | Performed by: INTERNAL MEDICINE

## 2021-09-07 PROCEDURE — 36415 COLL VENOUS BLD VENIPUNCTURE: CPT | Performed by: INTERNAL MEDICINE

## 2021-09-07 RX ORDER — CYCLOBENZAPRINE HCL 10 MG
5 TABLET ORAL 3 TIMES DAILY PRN
Qty: 30 TABLET | Refills: 0 | Status: SHIPPED | OUTPATIENT
Start: 2021-09-07 | End: 2021-09-14

## 2021-09-07 RX ORDER — AMOXICILLIN 500 MG/1
500 CAPSULE ORAL 3 TIMES DAILY
Qty: 30 CAPSULE | Refills: 0 | Status: SHIPPED | OUTPATIENT
Start: 2021-09-07 | End: 2021-09-17

## 2021-09-07 RX ORDER — FLUCONAZOLE 150 MG/1
150 TABLET ORAL ONCE
Qty: 1 TABLET | Refills: 0 | Status: SHIPPED | OUTPATIENT
Start: 2021-09-07 | End: 2021-09-07

## 2021-09-07 ASSESSMENT — PATIENT HEALTH QUESTIONNAIRE - PHQ9
SUM OF ALL RESPONSES TO PHQ QUESTIONS 1-9: 0
2. FEELING DOWN, DEPRESSED OR HOPELESS: 0
SUM OF ALL RESPONSES TO PHQ9 QUESTIONS 1 & 2: 0
SUM OF ALL RESPONSES TO PHQ QUESTIONS 1-9: 0
SUM OF ALL RESPONSES TO PHQ QUESTIONS 1-9: 0
1. LITTLE INTEREST OR PLEASURE IN DOING THINGS: 0

## 2021-09-07 ASSESSMENT — ENCOUNTER SYMPTOMS
BACK PAIN: 1
RESPIRATORY NEGATIVE: 1

## 2021-09-07 NOTE — PROGRESS NOTES
2021    Veryl Reasons (:  1955) is a 61 y.o. female, here for a preventive medicine evaluation. Patient Active Problem List   Diagnosis    IGT (impaired glucose tolerance)    Asymptomatic varicose veins    Disease of jaw    History of nonmelanoma skin cancer    Irritable bowel syndrome    Other benign neoplasm of connective and other soft tissue of unspecified site    Rosacea    Encounter for screening mammogram for breast cancer    Musculoskeletal back pain    Submandibular gland swelling    Candidiasis       Review of Systems   Constitutional: Negative. HENT: Negative. Eyes:        Goes for eye checks annually. Respiratory: Negative. Cardiovascular: Negative. Gastrointestinal:        Colonoscopy . Endocrine: Negative. Genitourinary: Negative.     No family history of ovarian or breast cancer. Musculoskeletal: Positive for back pain. Back pain for four days. In lower back and moving down into the buttock. Allergic/Immunologic: Positive for environmental allergies. Neurological: Negative. Hematological: Negative. Psychiatric/Behavioral: Negative. Prior to Visit Medications    Medication Sig Taking? Authorizing Provider   metroNIDAZOLE (METROCREAM) 0.75 % cream Apply topically 2 times daily Apply topically 2 times daily. Does not need yet, file script.  Yes Hemanth Mcgill MD   cyclobenzaprine (FLEXERIL) 10 MG tablet Take 0.5 tablets by mouth 3 times daily as needed for Muscle spasms Yes Hemanth Mcgill MD   amoxicillin (AMOXIL) 500 MG capsule Take 1 capsule by mouth 3 times daily for 10 days Yes Hemanth Mcgill MD   fluconazole (DIFLUCAN) 150 MG tablet Take 1 tablet by mouth once for 1 dose Yes Hemanth Mcgill MD   Vitamin D, Cholecalciferol, 25 MCG (1000 UT) CAPS Take 1 capsule by mouth daily Yes Historical Provider, MD   Cetirizine HCl (ZYRTEC ALLERGY PO) Take 1 tablet by mouth daily Yes Historical Provider, MD        No Known Allergies    Past Medical History:   Diagnosis Date    Elevated TSH     History of skin cancer     IBS (irritable bowel syndrome)     Rosacea     Seasonal allergies     Sty        Past Surgical History:   Procedure Laterality Date    COLONOSCOPY N/A 1/29/2021    COLORECTAL CANCER SCREENING, NOT HIGH RISK performed by Juventino Almeida MD at [facility] [address]      face, x3       Social History     Socioeconomic History    Marital status:      Spouse name: Not on file    Number of children: Not on file    Years of education: Not on file    Highest education level: Not on file   Occupational History    Occupation:     Tobacco Use    Smoking status: Never Smoker    Smokeless tobacco: Never Used   Vaping Use    Vaping Use: Never used   Substance and Sexual Activity    Alcohol use: No    Drug use: Never    Sexual activity: Not on file     Comment:    Other Topics Concern    Not on file   Social History Narrative    Not on file     Social Determinants of Health     Financial Resource Strain: Low Risk     Difficulty of Paying Living Expenses: Not hard at all   Food Insecurity: No Food Insecurity    Worried About [address] in the Last Year: Never true    [address]  in the Last Year: Never true   Transportation Needs: No Transportation Needs    Lack of Transportation (Medical): No    Lack of Transportation (Non-Medical):  No   Physical Activity:     Days of Exercise per Week:     Minutes of Exercise per Session:    Stress:     Feeling of Stress :    Social Connections:     Frequency of Communication with Friends and Family:     Frequency of Social Gatherings with Friends and Family:     Attends [employer] Services:     Active Member of Clubs or Organizations:     Attends Club or Organization Meetings:     Marital Status:    Intimate Partner Violence:     Fear of Current or Ex-Partner:     Emotionally Abused:     Physically Abused:     Sexually Abused:         Family History   Problem Relation Age of Onset    Cancer Mother         non hodgekin's lymphoma    High Blood Pressure Father     Heart Failure Sister     Stroke Sister     Diabetes Maternal Grandmother        ADVANCE DIRECTIVE: N, <no information>    Vitals:    09/07/21 1002   BP: 125/62   Site: Left Upper Arm   Position: Sitting   Cuff Size: Medium Adult   Pulse: 53   Resp: 17   SpO2: 97%   Weight: 184 lb (83.5 kg)   Height: 5' 5\" (1.651 m)     Estimated body mass index is 30.62 kg/m² as calculated from the following:    Height as of this encounter: 5' 5\" (1.651 m). Weight as of this encounter: 184 lb (83.5 kg). Physical Exam  Constitutional:       General: She is not in acute distress. Appearance: Normal appearance. She is not ill-appearing, toxic-appearing or diaphoretic. HENT:      Head: Normocephalic. Comments: Swollen right submandibular gland and painful     Right Ear: Tympanic membrane normal. There is no impacted cerumen. Left Ear: Tympanic membrane normal. There is no impacted cerumen. Nose: Nose normal.   Eyes:      Pupils: Pupils are equal, round, and reactive to light. Neck:      Vascular: No carotid bruit. Musculoskeletal:      Cervical back: Normal range of motion and neck supple. No rigidity or tenderness. Lymphadenopathy:      Cervical: No cervical adenopathy. Neurological:      Mental Status: She is alert. Cranial Nerves: No cranial nerve deficit. Sensory: No sensory deficit. Motor: No weakness. Coordination: Coordination normal.      Gait: Gait normal.      Comments: Negative straight leg lift   Psychiatric:         Mood and Affect: Mood normal.         Behavior: Behavior normal.         Thought Content: Thought content normal.         Judgment: Judgment normal.         No flowsheet data found.     Lab Results   Component Value Date    CHOL 160 06/18/2020    CHOL Well-controlled, continue current medications and uses metrogel twice a week and well controlled symptoms. Orders:  -     metroNIDAZOLE (METROCREAM) 0.75 % cream; Apply topically 2 times daily Apply topically 2 times daily. Does not need yet, file script., Topical, 2 TIMES DAILY Starting Tue 9/7/2021, Disp-60 g, R-11, Normal  3. Encounter for screening mammogram for breast cancer  Assessment & Plan:   Asymptomatic, schedule mammogram.  Has [facility] appointment already. Orders:  -     [facility] SERVANDO DIGITAL SCREEN BILATERAL; Future  4. Musculoskeletal back pain  Assessment & Plan:   Uncontrolled, low back pain radiating into both buttocks with recent exaceration a few days ago without clear cause. Pain intermittently for years. .  Start back and core exercises and since a chronic condition, will get xray. No abnormal neuro findings on exam today. Orders:  -     cyclobenzaprine (FLEXERIL) 10 MG tablet; Take 0.5 tablets by mouth 3 times daily as needed for Muscle spasms, Disp-30 tablet, R-0Normal  -     XR LUMBAR SPINE (2-3 VIEWS); Future  5. Submandibular gland swelling  Assessment & Plan:   Uncontrolled, with enlargement and tenderness of gland. Will give amoxicillin 500  mg tid for ten days. evaluate in one month. History of covid vaccine and no symptoms of covid. Orders:  -     amoxicillin (AMOXIL) 500 MG capsule; Take 1 capsule by mouth 3 times daily for 10 days, Disp-30 capsule, R-0Normal  6. Candidiasis  Assessment & Plan:   Asymptomatic, usually get covid with amoxicillin , so will prescribe diflucan. Patient will wait to take to see if she needs it. Orders:  -     fluconazole (DIFLUCAN) 150 MG tablet; Take 1 tablet by mouth once for 1 dose, Disp-1 tablet, R-0Normal      No follow-ups on file. An electronic signature was used to authenticate this note.     --Partica Infante MD on 9/7/2021 at 11:39 AM

## 2021-09-07 NOTE — PATIENT INSTRUCTIONS
Patient Education        Low Back Pain: Exercises  Introduction  Here are some examples of exercises for you to try. The exercises may be suggested for a condition or for rehabilitation. Start each exercise slowly. Ease off the exercises if you start to have pain. You will be told when to start these exercises and which ones will work best for you. How to do the exercises  Press-up   1. Lie on your stomach, supporting your body with your forearms. 2. Press your elbows down into the floor to raise your upper back. As you do this, relax your stomach muscles and allow your back to arch without using your back muscles. As your press up, do not let your hips or pelvis come off the floor. 3. Hold for 15 to 30 seconds, then relax. 4. Repeat 2 to 4 times. Alternate arm and leg (bird dog) exercise   Do this exercise slowly. Try to keep your body straight at all times, and do not let one hip drop lower than the other. 1. Start on the floor, on your hands and knees. 2. Tighten your belly muscles. 3. Raise one leg off the floor, and hold it straight out behind you. Be careful not to let your hip drop down, because that will twist your trunk. 4. Hold for about 6 seconds, then lower your leg and switch to the other leg. 5. Repeat 8 to 12 times on each leg. 6. Over time, work up to holding for 10 to 30 seconds each time. 7. If you feel stable and secure with your leg raised, try raising the opposite arm straight out in front of you at the same time. Knee-to-chest exercise   1. Lie on your back with your knees bent and your feet flat on the floor. 2. Bring one knee to your chest, keeping the other foot flat on the floor (or keeping the other leg straight, whichever feels better on your lower back). 3. Keep your lower back pressed to the floor. Hold for at least 15 to 30 seconds. 4. Relax, and lower the knee to the starting position. 5. Repeat with the other leg. Repeat 2 to 4 times with each leg.   6. To get more stretch, put your other leg flat on the floor while pulling your knee to your chest.    Curl-ups   1. Lie on the floor on your back with your knees bent at a 90-degree angle. Your feet should be flat on the floor, about 12 inches from your buttocks. 2. Cross your arms over your chest. If this bothers your neck, try putting your hands behind your neck (not your head), with your elbows spread apart. 3. Slowly tighten your belly muscles and raise your shoulder blades off the floor. 4. Keep your head in line with your body, and do not press your chin to your chest.  5. Hold this position for 1 or 2 seconds, then slowly lower yourself back down to the floor. 6. Repeat 8 to 12 times. Pelvic tilt exercise   1. Lie on your back with your knees bent. 2. \"Brace\" your stomach. This means to tighten your muscles by pulling in and imagining your belly button moving toward your spine. You should feel like your back is pressing to the floor and your hips and pelvis are rocking back. 3. Hold for about 6 seconds while you breathe smoothly. 4. Repeat 8 to 12 times. Heel dig bridging   1. Lie on your back with both knees bent and your ankles bent so that only your heels are digging into the floor. Your knees should be bent about 90 degrees. 2. Then push your heels into the floor, squeeze your buttocks, and lift your hips off the floor until your shoulders, hips, and knees are all in a straight line. 3. Hold for about 6 seconds as you continue to breathe normally, and then slowly lower your hips back down to the floor and rest for up to 10 seconds. 4. Do 8 to 12 repetitions. Hamstring stretch in doorway   1. Lie on your back in a doorway, with one leg through the open door. 2. Slide your leg up the wall to straighten your knee. You should feel a gentle stretch down the back of your leg. 3. Hold the stretch for at least 15 to 30 seconds. Do not arch your back, point your toes, or bend either knee.  Keep one heel touching the floor and the other heel touching the wall. 4. Repeat with your other leg. 5. Do 2 to 4 times for each leg. Hip flexor stretch   1. Kneel on the floor with one knee bent and one leg behind you. Place your forward knee over your foot. Keep your other knee touching the floor. 2. Slowly push your hips forward until you feel a stretch in the upper thigh of your rear leg. 3. Hold the stretch for at least 15 to 30 seconds. Repeat with your other leg. 4. Do 2 to 4 times on each side. Wall sit   1. Stand with your back 10 to 12 inches away from a wall. 2. Lean into the wall until your back is flat against it. 3. Slowly slide down until your knees are slightly bent, pressing your lower back into the wall. 4. Hold for about 6 seconds, then slide back up the wall. 5. Repeat 8 to 12 times. Follow-up care is a key part of your treatment and safety. Be sure to make and go to all appointments, and call your doctor if you are having problems. It's also a good idea to know your test results and keep a list of the medicines you take. Where can you learn more? Go to https://SegmintpeCloudSplit.Helpstream. org and sign in to your [employer] account. Enter W043 in the hi5 box to learn more about \"Low Back Pain: Exercises. \"     If you do not have an account, please click on the \"Sign Up Now\" link. Current as of: November 16, 2020               Content Version: 12.9  © 2006-2021 Healthwise, Incorporated. Care instructions adapted under license by [facility] ([facility]). If you have questions about a medical condition or this instruction, always ask your healthcare professional. [place] [address] any warranty or liability for your use of this information. Patient Education         Back Pain: Strengthening Your Core (00:29)  Your health professional recommends that you watch this short online health video.   Learn how being active builds a strong core and keeps your back healthy. Purpose:  Discusses why a strong core is necessary for a healthy back, and how walking, biking, and swimming improve core strength. Goal:  The user will learn that having a healthy back depends on having a strong core. How to watch the video    Scan the QR code   OR Visit the website    https://hwi. se/r/Vfezeit2vzby6   Current as of: November 16, 2020               Content Version: 12.9  © 2006-2021 Healthwise, Incorporated. Care instructions adapted under license by [facility] ([facility]). If you have questions about a medical condition or this instruction, always ask your healthcare professional. [address] any warranty or liability for your use of this information.

## 2021-09-07 NOTE — ASSESSMENT & PLAN NOTE
Uncontrolled, low back pain radiating into both buttocks with recent exaceration a few days ago without clear cause. Pain intermittently for years. .  Start back and core exercises and since a chronic condition, will get xray. No abnormal neuro findings on exam today.

## 2021-09-07 NOTE — LETTER
[address] [address] [address] [address]  Phone: [phone]  Fax: [phone]    Tae Lyons MD        September 7, 2021     Patient: Jossy Lopez   YOB: 1955   Date of Visit: 9/7/2021       To Pharmacist:    Please give Rashmi Speak. If you have any questions or concerns, please don't hesitate to call.     Sincerely,          Tae Lyons MD

## 2021-09-07 NOTE — ASSESSMENT & PLAN NOTE
Well-controlled, continue current medications and uses metrogel twice a week and well controlled symptoms.

## 2021-09-07 NOTE — ASSESSMENT & PLAN NOTE
Uncontrolled, with enlargement and tenderness of gland. Will give amoxicillin 500  mg tid for ten days. evaluate in one month. History of covid vaccine and no symptoms of covid.

## 2021-09-09 ENCOUNTER — HOSPITAL ENCOUNTER (OUTPATIENT)
Dept: GENERAL RADIOLOGY | Age: 66
Discharge: HOME | End: 2021-09-09
Payer: COMMERCIAL

## 2021-09-09 ENCOUNTER — HOSPITAL ENCOUNTER (OUTPATIENT)
Age: 66
Discharge: HOME | End: 2021-09-09
Payer: COMMERCIAL

## 2021-09-09 DIAGNOSIS — M47.16 OSTEOARTHRITIS OF LUMBAR SPINE WITH MYELOPATHY: Primary | ICD-10-CM

## 2021-09-09 DIAGNOSIS — M54.9 MUSCULOSKELETAL BACK PAIN: ICD-10-CM

## 2021-09-09 PROCEDURE — 72100 X-RAY EXAM L-S SPINE 2/3 VWS: CPT

## 2021-09-09 RX ORDER — FLUCONAZOLE 150 MG/1
TABLET ORAL
COMMUNITY
Start: 2021-09-07

## 2021-10-05 ENCOUNTER — OFFICE VISIT (OUTPATIENT)
Dept: INTERNAL MEDICINE CLINIC | Age: 66
End: 2021-10-05
Payer: COMMERCIAL

## 2021-10-05 VITALS
HEIGHT: 65 IN | RESPIRATION RATE: 16 BRPM | SYSTOLIC BLOOD PRESSURE: 115 MMHG | DIASTOLIC BLOOD PRESSURE: 70 MMHG | HEART RATE: 57 BPM | BODY MASS INDEX: 30.59 KG/M2 | WEIGHT: 183.6 LBS | OXYGEN SATURATION: 98 %

## 2021-10-05 DIAGNOSIS — Z78.0 MENOPAUSE: Primary | ICD-10-CM

## 2021-10-05 DIAGNOSIS — M47.816 SPONDYLOSIS OF LUMBAR REGION WITHOUT MYELOPATHY OR RADICULOPATHY: ICD-10-CM

## 2021-10-05 PROCEDURE — 99213 OFFICE O/P EST LOW 20 MIN: CPT | Performed by: INTERNAL MEDICINE

## 2021-10-05 PROCEDURE — 90471 IMMUNIZATION ADMIN: CPT | Performed by: INTERNAL MEDICINE

## 2021-10-05 PROCEDURE — 90715 TDAP VACCINE 7 YRS/> IM: CPT | Performed by: INTERNAL MEDICINE

## 2021-10-05 ASSESSMENT — ENCOUNTER SYMPTOMS
BOWEL INCONTINENCE: 0
RESPIRATORY NEGATIVE: 1
ABDOMINAL PAIN: 0
BACK PAIN: 1

## 2021-10-05 ASSESSMENT — PATIENT HEALTH QUESTIONNAIRE - PHQ9
2. FEELING DOWN, DEPRESSED OR HOPELESS: 0
SUM OF ALL RESPONSES TO PHQ9 QUESTIONS 1 & 2: 0
1. LITTLE INTEREST OR PLEASURE IN DOING THINGS: 0
SUM OF ALL RESPONSES TO PHQ QUESTIONS 1-9: 0

## 2021-10-05 NOTE — PROGRESS NOTES
Reji Jenkins (:  1955) is a 61 y.o. female,Established patient, here for evaluation of the following chief complaint(s):  1 Month Follow-Up         ASSESSMENT/PLAN:  1. Menopause  -     DEXA BONE DENSITY 2 SITES; Future  2. Spondylosis of lumbar region without myelopathy or radiculopathy: Reviewed x-ray results with patient swelling degenerative disc disease and explained the pathophysiology. Doing well currently not symptomatic. Continue with core strengthening and avoiding heavy lifting with back protective behavior. We will give Tdap. Return in about 6 months (around 2022). Subjective   SUBJECTIVE/OBJECTIVE:  Back Pain  Chronicity: no back pain currently doing the flexibilty exercises. The problem occurs rarely. The problem has been gradually improving since onset. The pain is present in the lumbar spine. The quality of the pain is described as aching. The pain does not radiate. The patient is experiencing no pain. Pertinent negatives include no abdominal pain, bladder incontinence, bowel incontinence, numbness, paresis, paresthesias or pelvic pain. Review of Systems   Constitutional: Negative. HENT: Negative. Eyes:        Goes for eye checks annually. Respiratory: Negative. Cardiovascular: Negative. Gastrointestinal: Negative for abdominal pain and bowel incontinence. Colonoscopy . Endocrine: Negative. Genitourinary: Negative. Negative for bladder incontinence and pelvic pain.     No family history of ovarian or breast cancer. Musculoskeletal: Positive for back pain. Back pain for four days. In lower back and moving down into the buttock. Allergic/Immunologic: Positive for environmental allergies. Neurological: Negative. Negative for numbness and paresthesias. Hematological: Negative. Psychiatric/Behavioral: Negative.            Objective   Physical Exam  Constitutional:       General: She is not in acute distress. Appearance: Normal appearance. She is not ill-appearing, toxic-appearing or diaphoretic. HENT:      Head: Normocephalic. Comments: Swollen right submandibular gland and painful     Right Ear: Tympanic membrane normal. There is no impacted cerumen. Left Ear: Tympanic membrane normal. There is no impacted cerumen. Nose: Nose normal.   Eyes:      Pupils: Pupils are equal, round, and reactive to light. Neck:      Vascular: No carotid bruit. Cardiovascular:      Pulses: Normal pulses. Heart sounds: Normal heart sounds. Pulmonary:      Effort: Pulmonary effort is normal.      Breath sounds: Normal breath sounds. Abdominal:      General: Abdomen is flat. There is no distension. Palpations: Abdomen is soft. There is no mass. Tenderness: There is no abdominal tenderness. Hernia: No hernia is present. Musculoskeletal:      Cervical back: Normal range of motion and neck supple. No rigidity or tenderness. Lymphadenopathy:      Cervical: No cervical adenopathy. Neurological:      General: No focal deficit present. Mental Status: She is alert and oriented to person, place, and time. Cranial Nerves: No cranial nerve deficit. Sensory: No sensory deficit. Motor: No weakness. Coordination: Coordination normal.      Gait: Gait normal.      Comments: Able to walk on heals and toes, able to flex forward and touch toes. Psychiatric:         Mood and Affect: Mood normal.         Behavior: Behavior normal.         Thought Content: Thought content normal.         Judgment: Judgment normal.            An electronic signature was used to authenticate this note.     --General Michael MD

## 2021-10-07 PROBLEM — Z12.31 ENCOUNTER FOR SCREENING MAMMOGRAM FOR BREAST CANCER: Status: RESOLVED | Noted: 2021-09-07 | Resolved: 2021-10-07

## 2021-10-21 ENCOUNTER — HOSPITAL ENCOUNTER (OUTPATIENT)
Dept: MAMMOGRAPHY | Age: 66
Discharge: HOME | End: 2021-10-26
Payer: COMMERCIAL

## 2021-10-21 VITALS — WEIGHT: 180 LBS | BODY MASS INDEX: 29.99 KG/M2 | HEIGHT: 65 IN

## 2021-10-21 DIAGNOSIS — Z12.31 VISIT FOR SCREENING MAMMOGRAM: ICD-10-CM

## 2021-10-21 PROCEDURE — 77063 BREAST TOMOSYNTHESIS BI: CPT

## 2022-04-05 ENCOUNTER — OFFICE VISIT (OUTPATIENT)
Dept: INTERNAL MEDICINE CLINIC | Age: 67
End: 2022-04-05
Payer: COMMERCIAL

## 2022-04-05 VITALS
HEIGHT: 65 IN | SYSTOLIC BLOOD PRESSURE: 122 MMHG | OXYGEN SATURATION: 98 % | BODY MASS INDEX: 31.42 KG/M2 | DIASTOLIC BLOOD PRESSURE: 60 MMHG | WEIGHT: 188.6 LBS | HEART RATE: 65 BPM | RESPIRATION RATE: 18 BRPM

## 2022-04-05 DIAGNOSIS — Z23 NEED FOR VACCINATION AGAINST STREPTOCOCCUS PNEUMONIAE: ICD-10-CM

## 2022-04-05 DIAGNOSIS — Z78.0 MENOPAUSE: Primary | ICD-10-CM

## 2022-04-05 DIAGNOSIS — M47.816 SPONDYLOSIS OF LUMBAR REGION WITHOUT MYELOPATHY OR RADICULOPATHY: ICD-10-CM

## 2022-04-05 PROCEDURE — 99213 OFFICE O/P EST LOW 20 MIN: CPT | Performed by: INTERNAL MEDICINE

## 2022-04-05 PROCEDURE — 90732 PPSV23 VACC 2 YRS+ SUBQ/IM: CPT | Performed by: INTERNAL MEDICINE

## 2022-04-05 PROCEDURE — 90471 IMMUNIZATION ADMIN: CPT | Performed by: INTERNAL MEDICINE

## 2022-04-05 SDOH — ECONOMIC STABILITY: FOOD INSECURITY: WITHIN THE PAST 12 MONTHS, THE FOOD YOU BOUGHT JUST DIDN'T LAST AND YOU DIDN'T HAVE MONEY TO GET MORE.: NEVER TRUE

## 2022-04-05 SDOH — ECONOMIC STABILITY: FOOD INSECURITY: WITHIN THE PAST 12 MONTHS, YOU WORRIED THAT YOUR FOOD WOULD RUN OUT BEFORE YOU GOT MONEY TO BUY MORE.: NEVER TRUE

## 2022-04-05 ASSESSMENT — ENCOUNTER SYMPTOMS
BACK PAIN: 0
RESPIRATORY NEGATIVE: 1
ABDOMINAL PAIN: 0

## 2022-04-05 ASSESSMENT — PATIENT HEALTH QUESTIONNAIRE - PHQ9
SUM OF ALL RESPONSES TO PHQ9 QUESTIONS 1 & 2: 0
SUM OF ALL RESPONSES TO PHQ QUESTIONS 1-9: 0
SUM OF ALL RESPONSES TO PHQ QUESTIONS 1-9: 0
1. LITTLE INTEREST OR PLEASURE IN DOING THINGS: 0
SUM OF ALL RESPONSES TO PHQ QUESTIONS 1-9: 0
SUM OF ALL RESPONSES TO PHQ QUESTIONS 1-9: 0
2. FEELING DOWN, DEPRESSED OR HOPELESS: 0

## 2022-04-05 ASSESSMENT — SOCIAL DETERMINANTS OF HEALTH (SDOH): HOW HARD IS IT FOR YOU TO PAY FOR THE VERY BASICS LIKE FOOD, HOUSING, MEDICAL CARE, AND HEATING?: NOT HARD AT ALL

## 2022-04-05 NOTE — PROGRESS NOTES
2022    Ariane Cunningham (:  1955) is a 74 y.o. female,   Patient is doing well with lumbar spondylosis with no recurrence of low back pain after therapy. She was given a home regimen. Patient encourage to continue regimen, although doing well currently to reduce risk of recurrence. Patient needs bone density screening. Discussed that she is eligible for covid booster and needs pnemovax 23 vaccine and agrees to take. Patient Active Problem List   Diagnosis    IGT (impaired glucose tolerance)    Asymptomatic varicose veins    Disease of jaw    History of nonmelanoma skin cancer    Irritable bowel syndrome    Other benign neoplasm of connective and other soft tissue of unspecified site    Rosacea    Musculoskeletal back pain    Submandibular gland swelling    Candidiasis    Spondylosis of lumbar region without myelopathy or radiculopathy    Menopause    Need for vaccination against Streptococcus pneumoniae       Review of Systems   Constitutional: Negative. HENT: Negative. Eyes:        Goes for eye checks annually. Respiratory: Negative. Cardiovascular: Negative. Gastrointestinal: Negative for abdominal pain. Colonoscopy . Endocrine: Negative. Genitourinary: Negative. Negative for pelvic pain.     No family history of ovarian or breast cancer. Musculoskeletal: Negative for back pain. Allergic/Immunologic: Positive for environmental allergies. Neurological: Negative. Negative for numbness. Hematological: Negative. Psychiatric/Behavioral: Negative. Prior to Visit Medications    Medication Sig Taking? Authorizing Provider   fluconazole (DIFLUCAN) 150 MG tablet  Yes Historical Provider, MD   metroNIDAZOLE (METROCREAM) 0.75 % cream Apply topically 2 times daily Apply topically 2 times daily. Does not need yet, file script.  Yes Terrance Trevino MD   Vitamin D, Cholecalciferol, 25 MCG (1000 UT) CAPS Take 1 capsule by mouth daily Yes Historical Provider, MD   Cetirizine HCl (ZYRTEC ALLERGY PO) Take 1 tablet by mouth daily  Yes Historical Provider, MD        Allergies   Allergen Reactions    Seasonal        Past Medical History:   Diagnosis Date    Elevated TSH     History of skin cancer     IBS (irritable bowel syndrome)     Rosacea     Seasonal allergies     Sty        Past Surgical History:   Procedure Laterality Date    COLONOSCOPY N/A 1/29/2021    COLORECTAL CANCER SCREENING, NOT HIGH RISK performed by Arnold Valenzuela MD at [facility] [address]      face, x3       Social History     Socioeconomic History    Marital status:      Spouse name: Not on file    Number of children: Not on file    Years of education: Not on file    Highest education level: Not on file   Occupational History    Occupation:     Tobacco Use    Smoking status: Never Smoker    Smokeless tobacco: Never Used   Vaping Use    Vaping Use: Never used   Substance and Sexual Activity    Alcohol use: No    Drug use: Never    Sexual activity: Not on file     Comment:    Other Topics Concern    Not on file   Social History Narrative    Not on file     Social Determinants of Health     Financial Resource Strain: Low Risk     Difficulty of Paying Living Expenses: Not hard at all   Food Insecurity: No Food Insecurity    Worried About [facility] in the Last Year: Never true    [facility] in the Last Year: Never true   Transportation Needs: No Transportation Needs    Lack of Transportation (Medical): No    Lack of Transportation (Non-Medical):  No   Physical Activity:     Days of Exercise per Week: Not on file    Minutes of Exercise per Session: Not on file   Stress:     Feeling of Stress : Not on file   Social Connections:     Frequency of Communication with Friends and Family: Not on file    Frequency of Social Gatherings with Friends and Family: Not on file    Attends Anglican Services: Not on file    Active Member of Clubs or Organizations: Not on file    Attends Club or Organization Meetings: Not on file    Marital Status: Not on file   Intimate Partner Violence:     Fear of Current or Ex-Partner: Not on file    Emotionally Abused: Not on file    Physically Abused: Not on file    Sexually Abused: Not on file   Housing Stability:     Unable to Pay for Housing in the Last Year: Not on file    Number of [place] in the Last Year: Not on file    Unstable Housing in the Last Year: Not on file        Family History   Problem Relation Age of Onset    Cancer Mother         non hodgekin's lymphoma    High Blood Pressure Father     Heart Failure Sister     Stroke Sister     Diabetes Maternal Grandmother        ADVANCE DIRECTIVE: N, <no information>    Vitals:    04/05/22 1545   BP: 122/60   Site: Left Upper Arm   Position: Sitting   Cuff Size: Medium Adult   Pulse: 65   Resp: 18   SpO2: 98%   Weight: 188 lb 9.6 oz (85.5 kg)   Height: 5' 5\" (1.651 m)     Estimated body mass index is 31.38 kg/m² as calculated from the following:    Height as of this encounter: 5' 5\" (1.651 m). Weight as of this encounter: 188 lb 9.6 oz (85.5 kg). Physical Exam  Constitutional:       General: She is not in acute distress. Appearance: Normal appearance. She is not ill-appearing, toxic-appearing or diaphoretic. HENT:      Head: Normocephalic. Right Ear: Tympanic membrane normal. There is no impacted cerumen. Left Ear: Tympanic membrane normal. There is no impacted cerumen. Nose: Nose normal.   Eyes:      Pupils: Pupils are equal, round, and reactive to light. Neck:      Vascular: No carotid bruit. Cardiovascular:      Pulses: Normal pulses. Heart sounds: Normal heart sounds. Pulmonary:      Effort: Pulmonary effort is normal.      Breath sounds: Normal breath sounds. Abdominal:      General: Abdomen is flat. There is no distension.       Palpations: Abdomen is soft. There is no mass. Tenderness: There is no abdominal tenderness. Hernia: No hernia is present. Musculoskeletal:      Cervical back: Normal range of motion and neck supple. No rigidity or tenderness. Right lower leg: No edema. Left lower leg: No edema. Lymphadenopathy:      Cervical: No cervical adenopathy. Skin:     General: Skin is warm and dry. Neurological:      General: No focal deficit present. Mental Status: She is alert and oriented to person, place, and time. Cranial Nerves: No cranial nerve deficit. Sensory: No sensory deficit. Motor: No weakness. Coordination: Coordination normal.      Gait: Gait normal.      Comments: Able to walk on heals and toes, able to flex forward and touch toes. Psychiatric:         Mood and Affect: Mood normal.         Behavior: Behavior normal.         Thought Content: Thought content normal.         Judgment: Judgment normal.         No flowsheet data found.     Lab Results   Component Value Date    CHOL 174 09/07/2021    CHOL 160 06/18/2020    CHOL 161 10/01/2019    TRIG 125 09/07/2021    TRIG 121 06/18/2020    TRIG 135 10/01/2019    HDL 50 09/07/2021    HDL 47 06/18/2020    HDL 50 10/01/2019    LDLCALC 99 09/07/2021    LDLCALC 89 06/18/2020    LDLCALC 84 10/01/2019    GLUCOSE 90 09/07/2021    LABA1C 5.3 06/18/2020    LABA1C 5.5 03/14/2016       The 10-year ASCVD risk score (Tatum Arevalo, et al., 2013) is: 4.9%    Values used to calculate the score:      Age: 74 years      Sex: Female      Is Non- : No      Diabetic: No      Tobacco smoker: No      Systolic Blood Pressure: 456 mmHg      Is BP treated: No      HDL Cholesterol: 50 mg/dL      Total Cholesterol: 174 mg/dL    Immunization History   Administered Date(s) Administered    COVID-19, Pfizer Purple top, DILUTE for use, 12+ yrs, 30mcg/0.3mL dose 03/20/2021, 04/09/2021, 12/12/2021    Pneumococcal Polysaccharide ([id number]) 04/05/2022    Td (Adult), 5 Lf Tetanus Toxoid, Pf (Tenivac, Decavac) 07/22/2002    Td, unspecified formulation 07/22/2002    Tdap (Boostrix, Adacel) 10/05/2021    Zoster Live (Zostavax) 09/23/2017, 09/23/2017    Zoster Recombinant (Shingrix) 07/30/2020, 12/22/2020       Health Maintenance   Topic Date Due    Cervical cancer screen  Never done    DEXA (modify frequency per FRAX score)  Never done    Flu vaccine (Season Ended) 09/01/2022    Depression Screen  10/05/2022    Breast cancer screen  10/21/2023    Lipid screen  09/07/2026    Colorectal Cancer Screen  01/29/2031    DTaP/Tdap/Td vaccine (2 - Td or Tdap) 10/05/2031    Shingles Vaccine  Completed    Pneumococcal 65+ years Vaccine  Completed    COVID-19 Vaccine  Completed    Hepatitis C screen  Completed    HIV screen  Completed    Hepatitis A vaccine  Aged Out    Hepatitis B vaccine  Aged Out    Hib vaccine  Aged Out    Meningococcal (ACWY) vaccine  Aged Out       Assessment & Plan   Menopause, take Calcium and Vitamin D.  -     DEXA BONE DENSITY 2 SITES; Future  Need for vaccination against Streptococcus pneumoniae  -     PNEUMOVAX 23 subcutaneous/IM (Pneumococcal polysaccharide vaccine 23-valent >= 3yo)  Spondylosis of lumbar region without myelopathy or radiculopathy, continue core exercises to reduced risk of recurrence. Return in about 6 months (around 10/5/2022).          --Villa Vasquez MD

## 2022-09-29 ENCOUNTER — HOSPITAL ENCOUNTER (OUTPATIENT)
Dept: MAMMOGRAPHY | Age: 67
Discharge: HOME | End: 2022-09-29
Payer: COMMERCIAL

## 2022-09-29 VITALS — WEIGHT: 180 LBS | HEIGHT: 65 IN | BODY MASS INDEX: 29.99 KG/M2

## 2022-09-29 DIAGNOSIS — Z12.31 VISIT FOR SCREENING MAMMOGRAM: ICD-10-CM

## 2022-09-29 PROCEDURE — 77063 BREAST TOMOSYNTHESIS BI: CPT

## 2022-09-29 NOTE — RESULT ENCOUNTER NOTE
Your mammogram did not show signs of cancer. You should have a repeat mammogram in one year. Remember to do monthly self breast exams. Notify me if you notice any lumps, hardening or the skin or changes. My Chart message sent.

## 2022-11-08 ENCOUNTER — OFFICE VISIT (OUTPATIENT)
Dept: FAMILY MEDICINE CLINIC | Age: 67
End: 2022-11-08
Payer: COMMERCIAL

## 2022-11-08 VITALS
WEIGHT: 186 LBS | TEMPERATURE: 98.3 F | HEART RATE: 63 BPM | RESPIRATION RATE: 14 BRPM | OXYGEN SATURATION: 98 % | DIASTOLIC BLOOD PRESSURE: 76 MMHG | HEIGHT: 65 IN | SYSTOLIC BLOOD PRESSURE: 114 MMHG | BODY MASS INDEX: 30.99 KG/M2

## 2022-11-08 DIAGNOSIS — Z23 NEED FOR INFLUENZA VACCINATION: ICD-10-CM

## 2022-11-08 DIAGNOSIS — Z85.828 HISTORY OF NONMELANOMA SKIN CANCER: ICD-10-CM

## 2022-11-08 DIAGNOSIS — M51.36 DDD (DEGENERATIVE DISC DISEASE), LUMBAR: ICD-10-CM

## 2022-11-08 DIAGNOSIS — Z00.00 WELL ADULT EXAM: Primary | ICD-10-CM

## 2022-11-08 DIAGNOSIS — M79.604 LOW BACK PAIN RADIATING TO RIGHT LEG: ICD-10-CM

## 2022-11-08 DIAGNOSIS — Z00.00 WELL ADULT EXAM: ICD-10-CM

## 2022-11-08 DIAGNOSIS — L71.9 ROSACEA: ICD-10-CM

## 2022-11-08 DIAGNOSIS — M54.50 LOW BACK PAIN RADIATING TO RIGHT LEG: ICD-10-CM

## 2022-11-08 PROBLEM — R60.9 SUBMANDIBULAR GLAND SWELLING: Status: RESOLVED | Noted: 2021-09-07 | Resolved: 2022-11-08

## 2022-11-08 PROBLEM — R60.0 SUBMANDIBULAR GLAND SWELLING: Status: RESOLVED | Noted: 2021-09-07 | Resolved: 2022-11-08

## 2022-11-08 PROBLEM — B37.9 CANDIDIASIS: Status: RESOLVED | Noted: 2021-09-07 | Resolved: 2022-11-08

## 2022-11-08 LAB
A/G RATIO: 2 (ref 1.1–2.2)
ALBUMIN SERPL-MCNC: 4.5 G/DL (ref 3.4–5)
ALP BLD-CCNC: 92 U/L (ref 40–129)
ALT SERPL-CCNC: 20 U/L (ref 10–40)
ANION GAP SERPL CALCULATED.3IONS-SCNC: 10 MMOL/L (ref 3–16)
AST SERPL-CCNC: 22 U/L (ref 15–37)
BASOPHILS ABSOLUTE: 0 K/UL (ref 0–0.2)
BASOPHILS RELATIVE PERCENT: 0.8 %
BILIRUB SERPL-MCNC: 0.6 MG/DL (ref 0–1)
BUN BLDV-MCNC: 11 MG/DL (ref 7–20)
CALCIUM SERPL-MCNC: 9.3 MG/DL (ref 8.3–10.6)
CHLORIDE BLD-SCNC: 106 MMOL/L (ref 99–110)
CHOLESTEROL, TOTAL: 169 MG/DL (ref 0–199)
CO2: 25 MMOL/L (ref 21–32)
CREAT SERPL-MCNC: 0.7 MG/DL (ref 0.6–1.2)
EOSINOPHILS ABSOLUTE: 0 K/UL (ref 0–0.6)
EOSINOPHILS RELATIVE PERCENT: 0.9 %
GFR SERPL CREATININE-BSD FRML MDRD: >60 ML/MIN/{1.73_M2}
GLUCOSE BLD-MCNC: 78 MG/DL (ref 70–99)
HCT VFR BLD CALC: 43.8 % (ref 36–48)
HDLC SERPL-MCNC: 44 MG/DL (ref 40–60)
HEMOGLOBIN: 14.6 G/DL (ref 12–16)
LDL CHOLESTEROL CALCULATED: 97 MG/DL
LYMPHOCYTES ABSOLUTE: 1.7 K/UL (ref 1–5.1)
LYMPHOCYTES RELATIVE PERCENT: 37.2 %
MCH RBC QN AUTO: 30.3 PG (ref 26–34)
MCHC RBC AUTO-ENTMCNC: 33.4 G/DL (ref 31–36)
MCV RBC AUTO: 90.6 FL (ref 80–100)
MONOCYTES ABSOLUTE: 0.3 K/UL (ref 0–1.3)
MONOCYTES RELATIVE PERCENT: 7.3 %
NEUTROPHILS ABSOLUTE: 2.4 K/UL (ref 1.7–7.7)
NEUTROPHILS RELATIVE PERCENT: 53.8 %
PDW BLD-RTO: 13.3 % (ref 12.4–15.4)
PLATELET # BLD: 188 K/UL (ref 135–450)
PMV BLD AUTO: 9.3 FL (ref 5–10.5)
POTASSIUM SERPL-SCNC: 4.4 MMOL/L (ref 3.5–5.1)
RBC # BLD: 4.84 M/UL (ref 4–5.2)
SODIUM BLD-SCNC: 141 MMOL/L (ref 136–145)
T4 FREE: 1.2 NG/DL (ref 0.9–1.8)
TOTAL PROTEIN: 6.8 G/DL (ref 6.4–8.2)
TRIGL SERPL-MCNC: 140 MG/DL (ref 0–150)
TSH REFLEX: 5 UIU/ML (ref 0.27–4.2)
VLDLC SERPL CALC-MCNC: 28 MG/DL
WBC # BLD: 4.5 K/UL (ref 4–11)

## 2022-11-08 PROCEDURE — 90471 IMMUNIZATION ADMIN: CPT | Performed by: NURSE PRACTITIONER

## 2022-11-08 PROCEDURE — 90694 VACC AIIV4 NO PRSRV 0.5ML IM: CPT | Performed by: NURSE PRACTITIONER

## 2022-11-08 PROCEDURE — 99397 PER PM REEVAL EST PAT 65+ YR: CPT | Performed by: NURSE PRACTITIONER

## 2022-11-08 RX ORDER — CYCLOBENZAPRINE HCL 10 MG
10 TABLET ORAL 3 TIMES DAILY PRN
COMMUNITY

## 2022-11-08 ASSESSMENT — ENCOUNTER SYMPTOMS
COUGH: 0
SHORTNESS OF BREATH: 0
ABDOMINAL PAIN: 0
BACK PAIN: 1

## 2022-11-08 NOTE — PROGRESS NOTES
2022    Janine Ashton (:  1955) is a 74 y.o. female, here for evaluation of the following medical concerns:  Chief Complaint   Patient presents with    New Patient     Rt low back pain at times. Radiates into rt leg. HPI  Patient is here for a new patient visit. Previous PCP was Dr. Chely Aden. History of basal cell skin cancer excision. Follows with dermatologist yearly- Dr. Rufino Aceves. History of chronic mid to low back for multiple years. Takes flexeril 10 mg TID PRN and alternating between tylenol and ibuprofen. Reports that she will take for a couple of days and then pain will resolve. Pain will come and go. Has completed PT about 10 years ago. Does not do exercises at home. Will stiffen when sitting for too long but if she gets up and walks around pain will resolve. Sometimes pain will radiate down right leg. Denies numbness or weakness. Xray lumbar spine 2021 showed lumbar DDD. Rosacea- on metronidazole cream every Wed and Sat. Tolerating well. Allergies- on zyrtec daily which controls allergies     GYN- Dr. Moon Dress every 3 years. Last pap smear was 2 years ago. Denies previous abnormal pap smears. Works as a      Mother and sister have health issues and in a facility. Visits 5 days per week     Exercise- nothing routine   Diet- nothing specific     Review of Systems   Constitutional:  Negative for activity change. Respiratory:  Negative for cough and shortness of breath. Cardiovascular:  Negative for chest pain. Gastrointestinal:  Negative for abdominal pain. Musculoskeletal:  Positive for arthralgias, back pain and myalgias. Neurological:  Negative for dizziness, weakness, numbness and headaches. Psychiatric/Behavioral:  Negative for dysphoric mood. The patient is not nervous/anxious. Prior to Visit Medications    Medication Sig Taking?  Authorizing Provider   cyclobenzaprine (FLEXERIL) 10 MG tablet Take 10 mg by mouth 3 times daily as needed for Muscle spasms Yes Historical Provider, MD   metroNIDAZOLE (METROCREAM) 0.75 % cream Apply topically 2 times daily Apply topically 2 times daily. Does not need yet, file script. Yes Arcadio Ivy MD   Vitamin D, Cholecalciferol, 25 MCG (1000 UT) CAPS Take 1 capsule by mouth daily Yes Historical Provider, MD   Cetirizine HCl (ZYRTEC ALLERGY PO) Take 1 tablet by mouth daily  Yes Historical Provider, MD   fluconazole (DIFLUCAN) 150 MG tablet   Historical Provider, MD        Allergies   Allergen Reactions    Seasonal        Past Medical History:   Diagnosis Date    Elevated TSH     History of skin cancer     IBS (irritable bowel syndrome)     Rosacea     Seasonal allergies     Sty        Past Surgical History:   Procedure Laterality Date    COLONOSCOPY N/A 1/29/2021    COLORECTAL CANCER SCREENING, NOT HIGH RISK performed by Vickie Castellon MD at [address] [facility], x3       Social History     Socioeconomic History    Marital status:      Spouse name: Not on file    Number of children: Not on file    Years of education: Not on file    Highest education level: Not on file   Occupational History    Occupation:     Tobacco Use    Smoking status: Never    Smokeless tobacco: Never   Vaping Use    Vaping Use: Never used   Substance and Sexual Activity    Alcohol use: No    Drug use: Never    Sexual activity: Not on file     Comment:    Other Topics Concern    Not on file   Social History Narrative    Not on file     Social Determinants of Health     Financial Resource Strain: Low Risk     Difficulty of Paying Living Expenses: Not hard at all   Food Insecurity: No Food Insecurity    Worried About Running Out of Food in the Last Year: Never true    [address] in the Last Year: Never true   Transportation Needs: No Transportation Needs    Lack of Transportation (Medical): No    Lack of Transportation (Non-Medical):  No   Physical Behavior normal.         Thought Content: Thought content normal.         Judgment: Judgment normal.       ASSESSMENT/PLAN:  1. Well adult exam  - Comprehensive Metabolic Panel; Future  - TSH with Reflex; Future  - CBC with Auto Differential; Future  - Lipid Panel; Future  Healthy lifestyles reviewed: diet, aerobic exercise, sunscreen, vision and dental exams    Advised to obtain COVID-19 booster at her pharmacy. Mammogram 9/2022- repeat yearly     Colonoscopy- 1/2021 WNL recall 10 years with Dr. Lord Mitchell. GYN- continue f/u with Dr. Joanna De León     2. Low back pain radiating to right leg/ DDD (degenerative disc disease), lumbar  Pain is stable with PRN tylenol, ibuprofen, and flexeril   Advised to continue home PT exercises. Declines need for formal PT at this time. 4. Rosacea  Stable on metronidazole cream twice weekly     5. History of nonmelanoma skin cancer  Follows with dermatologist Dr. Beasley Dose yearly     6. Need for influenza vaccination  - Influenza, FLUAD, (age 72 y+), IM, PF, 0.5 mL    Return in about 1 year (around 11/8/2023), or if symptoms worsen or fail to improve, for physical.    An  electronic signature was used to authenticate this note.     --YOHANNES Bueno - CNP on 11/8/2022 at 7:58 AM

## 2022-11-09 PROBLEM — R79.89 ABNORMAL TSH: Status: ACTIVE | Noted: 2022-11-09

## 2023-02-04 DIAGNOSIS — L71.9 ROSACEA: ICD-10-CM

## 2023-02-06 NOTE — TELEPHONE ENCOUNTER
Future Appointments   Date Time Provider Charline Prince   11/8/2023  8:00 AM YOHANNES Kemp [address]     Last appt on 11.8.2022

## 2023-06-08 ENCOUNTER — OFFICE VISIT (OUTPATIENT)
Dept: FAMILY MEDICINE CLINIC | Age: 68
End: 2023-06-08
Payer: COMMERCIAL

## 2023-06-08 VITALS
HEART RATE: 66 BPM | OXYGEN SATURATION: 98 % | TEMPERATURE: 98 F | SYSTOLIC BLOOD PRESSURE: 134 MMHG | RESPIRATION RATE: 16 BRPM | WEIGHT: 189.6 LBS | DIASTOLIC BLOOD PRESSURE: 76 MMHG | HEIGHT: 65 IN | BODY MASS INDEX: 31.59 KG/M2

## 2023-06-08 DIAGNOSIS — M79.605 LOW BACK PAIN RADIATING TO LEFT LEG: Primary | ICD-10-CM

## 2023-06-08 DIAGNOSIS — M54.50 LOW BACK PAIN RADIATING TO LEFT LEG: Primary | ICD-10-CM

## 2023-06-08 PROCEDURE — 99213 OFFICE O/P EST LOW 20 MIN: CPT | Performed by: NURSE PRACTITIONER

## 2023-06-08 PROCEDURE — 1123F ACP DISCUSS/DSCN MKR DOCD: CPT | Performed by: NURSE PRACTITIONER

## 2023-06-08 RX ORDER — TIZANIDINE 4 MG/1
4 TABLET ORAL 3 TIMES DAILY PRN
Qty: 30 TABLET | Refills: 0 | Status: SHIPPED | OUTPATIENT
Start: 2023-06-08

## 2023-06-08 RX ORDER — METHYLPREDNISOLONE 4 MG/1
TABLET ORAL
Qty: 1 KIT | Refills: 0 | Status: SHIPPED | OUTPATIENT
Start: 2023-06-08 | End: 2023-06-14

## 2023-06-08 SDOH — ECONOMIC STABILITY: FOOD INSECURITY: WITHIN THE PAST 12 MONTHS, THE FOOD YOU BOUGHT JUST DIDN'T LAST AND YOU DIDN'T HAVE MONEY TO GET MORE.: NEVER TRUE

## 2023-06-08 SDOH — ECONOMIC STABILITY: FOOD INSECURITY: WITHIN THE PAST 12 MONTHS, YOU WORRIED THAT YOUR FOOD WOULD RUN OUT BEFORE YOU GOT MONEY TO BUY MORE.: NEVER TRUE

## 2023-06-08 SDOH — ECONOMIC STABILITY: HOUSING INSECURITY
IN THE LAST 12 MONTHS, WAS THERE A TIME WHEN YOU DID NOT HAVE A STEADY PLACE TO SLEEP OR SLEPT IN A SHELTER (INCLUDING NOW)?: NO

## 2023-06-08 SDOH — ECONOMIC STABILITY: INCOME INSECURITY: HOW HARD IS IT FOR YOU TO PAY FOR THE VERY BASICS LIKE FOOD, HOUSING, MEDICAL CARE, AND HEATING?: NOT HARD AT ALL

## 2023-06-08 ASSESSMENT — ENCOUNTER SYMPTOMS
BACK PAIN: 1
CONSTIPATION: 0

## 2023-06-08 ASSESSMENT — PATIENT HEALTH QUESTIONNAIRE - PHQ9
2. FEELING DOWN, DEPRESSED OR HOPELESS: NOT AT ALL
1. LITTLE INTEREST OR PLEASURE IN DOING THINGS: 0
SUM OF ALL RESPONSES TO PHQ9 QUESTIONS 1 & 2: 0
SUM OF ALL RESPONSES TO PHQ9 QUESTIONS 1 & 2: 0
SUM OF ALL RESPONSES TO PHQ QUESTIONS 1-9: 0
1. LITTLE INTEREST OR PLEASURE IN DOING THINGS: NOT AT ALL
SUM OF ALL RESPONSES TO PHQ QUESTIONS 1-9: 0
SUM OF ALL RESPONSES TO PHQ QUESTIONS 1-9: 0
2. FEELING DOWN, DEPRESSED OR HOPELESS: 0
SUM OF ALL RESPONSES TO PHQ QUESTIONS 1-9: 0

## 2023-06-08 NOTE — PROGRESS NOTES
6/8/2023    This is a 79 y.o. female   Chief Complaint   Patient presents with    Back Pain     Started yesterday. More lt side low back. Constant ache and sharp at times. Tingling in legs. Tightness. No known injury. Neville LIGHT  Patient reports that yesterday she was in the shower and when she got out had left low back pain. Denies injury to area. Pain will radiate to left knee with sitting. Pain is worse with sitting but has pain with walking and laying. Denies numbness or muscle weakness. Has taken ibuprofen 400 mg and flexeril without improvement in symptoms. Pain is 4-5/10. Can increase to 10/10.     90/2021 lumbar xray showed DDD     Patient Active Problem List   Diagnosis    IGT (impaired glucose tolerance)    Asymptomatic varicose veins    History of nonmelanoma skin cancer    Irritable bowel syndrome    Other benign neoplasm of connective and other soft tissue of unspecified site    Rosacea    Musculoskeletal back pain    Spondylosis of lumbar region without myelopathy or radiculopathy    Menopause    Abnormal TSH       Current Outpatient Medications   Medication Sig Dispense Refill    metroNIDAZOLE (METROCREAM) 0.75 % cream APPLY TOPICALLY 2 TIMES DAILY APPLY TOPICALLY 2 TIMES DAILY. 45 g 2    cyclobenzaprine (FLEXERIL) 10 MG tablet Take 1 tablet by mouth 3 times daily as needed for Muscle spasms      Vitamin D, Cholecalciferol, 25 MCG (1000 UT) CAPS Take 1,000 Units by mouth daily      Cetirizine HCl (ZYRTEC ALLERGY PO) Take 1 tablet by mouth daily        No current facility-administered medications for this visit. Allergies   Allergen Reactions    Seasonal        Review of Systems   Constitutional:  Positive for activity change. Negative for fever. Gastrointestinal:  Negative for constipation. Genitourinary:  Negative for difficulty urinating. Musculoskeletal:  Positive for arthralgias, back pain and myalgias. Neurological:  Negative for weakness and numbness.      Vitals:    06/08/23

## 2023-06-22 ENCOUNTER — TELEPHONE (OUTPATIENT)
Dept: FAMILY MEDICINE CLINIC | Age: 68
End: 2023-06-22

## 2023-06-22 ENCOUNTER — HOSPITAL ENCOUNTER (OUTPATIENT)
Dept: PHYSICAL THERAPY | Age: 68
Setting detail: THERAPIES SERIES
Discharge: HOME | End: 2023-06-22

## 2023-06-22 DIAGNOSIS — M79.605 LOW BACK PAIN RADIATING TO LEFT LEG: Primary | ICD-10-CM

## 2023-06-22 DIAGNOSIS — M54.50 LOW BACK PAIN RADIATING TO LEFT LEG: Primary | ICD-10-CM

## 2023-06-22 NOTE — TELEPHONE ENCOUNTER
Pt  was referred to [facility] for PT. She would like to change the referral for PT to [facility] on [address], [place] [address]. [employer] is a lot out of pocket and Momo Viramontes is just a copy with her insurance.      Please Advise

## 2023-06-22 NOTE — FLOWSHEET NOTE
[address] [facility]     Physical Therapy  Cancellation/No-show Note  Patient Name:  Marshal Orourke  :  1955   Date:  2023  Cancelled visits to date: 01  No-shows to date: 0    For today's appointment patient:  [x]  Cancelled  []  Rescheduled appointment  []  No-show     Reason given by patient:  []  Patient ill  []  Conflicting appointment  []  No transportation    []  Conflict with work  []  No reason given  []  Other:     Comments:  Pt was called to discuss deductible/insurance benefits/estimate for todays visit. D/t cost, pt wishes to cancel todays appt and seek care elsewhere.      Electronically signed by:  Rosalina Evans, PT, PT

## 2023-07-06 ENCOUNTER — OFFICE VISIT (OUTPATIENT)
Dept: FAMILY MEDICINE CLINIC | Age: 68
End: 2023-07-06
Payer: COMMERCIAL

## 2023-07-06 VITALS
TEMPERATURE: 98.1 F | RESPIRATION RATE: 14 BRPM | BODY MASS INDEX: 31.52 KG/M2 | OXYGEN SATURATION: 95 % | SYSTOLIC BLOOD PRESSURE: 128 MMHG | DIASTOLIC BLOOD PRESSURE: 76 MMHG | HEIGHT: 65 IN | WEIGHT: 189.2 LBS | HEART RATE: 63 BPM

## 2023-07-06 DIAGNOSIS — M51.36 DDD (DEGENERATIVE DISC DISEASE), LUMBAR: ICD-10-CM

## 2023-07-06 DIAGNOSIS — M79.605 LOW BACK PAIN RADIATING TO LEFT LEG: Primary | ICD-10-CM

## 2023-07-06 DIAGNOSIS — M54.50 LOW BACK PAIN RADIATING TO LEFT LEG: Primary | ICD-10-CM

## 2023-07-06 PROCEDURE — 1123F ACP DISCUSS/DSCN MKR DOCD: CPT | Performed by: NURSE PRACTITIONER

## 2023-07-06 PROCEDURE — 99213 OFFICE O/P EST LOW 20 MIN: CPT | Performed by: NURSE PRACTITIONER

## 2023-07-06 ASSESSMENT — ENCOUNTER SYMPTOMS
DIARRHEA: 0
BACK PAIN: 1
SHORTNESS OF BREATH: 0
VOMITING: 0
CHEST TIGHTNESS: 0
CONSTIPATION: 0
NAUSEA: 0

## 2023-07-06 NOTE — PROGRESS NOTES
7/6/2023    This is a 79 y.o. female   Chief Complaint   Patient presents with    Back Pain     F/u for back pain. Doing much better. Still has some tightness. Still radiates into lt leg occasionally. Zheng LIGHT  Patient is here to f/u on her low back pain that radiates to left left. Was seen on 6/8/23 and treated with medrol dosepack and tizanidine TID PRN. Took tizanidine for a week. Referred to PT and started with PT with [facility] last week. Plan is go weekly. Reports that she continues to ice area PRN. Reports that her low back pain will come and go and radiating pain will also come and go. She has found if she is more active during the day that decreases her symptoms. If she sits for a long period of time that will worsen her symptoms. Denies muscle weakness. Reports that the radiating pain will cause slight tingling at times that will not last.     Taking tylenol daily PRN which is every couple of days. 9/2021 lumbar xray showed DDD    Patient Active Problem List   Diagnosis    IGT (impaired glucose tolerance)    Asymptomatic varicose veins    History of nonmelanoma skin cancer    Irritable bowel syndrome    Other benign neoplasm of connective and other soft tissue of unspecified site    Rosacea    Musculoskeletal back pain    Spondylosis of lumbar region without myelopathy or radiculopathy    Menopause    Abnormal TSH       Current Outpatient Medications   Medication Sig Dispense Refill    tiZANidine (ZANAFLEX) 4 MG tablet Take 1 tablet by mouth 3 times daily as needed (muscle spasm) 30 tablet 0    metroNIDAZOLE (METROCREAM) 0.75 % cream APPLY TOPICALLY 2 TIMES DAILY APPLY TOPICALLY 2 TIMES DAILY. 45 g 2    Vitamin D, Cholecalciferol, 25 MCG (1000 UT) CAPS Take 1,000 Units by mouth daily      Cetirizine HCl (ZYRTEC ALLERGY PO) Take 1 tablet by mouth daily        No current facility-administered medications for this visit.        Allergies   Allergen Reactions    Seasonal        Review of Systems

## 2023-10-11 ENCOUNTER — HOSPITAL ENCOUNTER (OUTPATIENT)
Dept: WOMENS IMAGING | Age: 68
Discharge: HOME | End: 2023-10-11
Payer: COMMERCIAL

## 2023-10-11 VITALS — WEIGHT: 180 LBS | BODY MASS INDEX: 29.99 KG/M2 | HEIGHT: 65 IN

## 2023-10-11 DIAGNOSIS — Z12.31 VISIT FOR SCREENING MAMMOGRAM: ICD-10-CM

## 2023-10-11 PROCEDURE — 77063 BREAST TOMOSYNTHESIS BI: CPT

## 2023-11-08 ENCOUNTER — OFFICE VISIT (OUTPATIENT)
Dept: FAMILY MEDICINE CLINIC | Age: 68
End: 2023-11-08
Payer: COMMERCIAL

## 2023-11-08 VITALS
WEIGHT: 189.2 LBS | SYSTOLIC BLOOD PRESSURE: 112 MMHG | HEART RATE: 55 BPM | HEIGHT: 65 IN | BODY MASS INDEX: 31.52 KG/M2 | RESPIRATION RATE: 18 BRPM | DIASTOLIC BLOOD PRESSURE: 72 MMHG | TEMPERATURE: 98.1 F | OXYGEN SATURATION: 98 %

## 2023-11-08 DIAGNOSIS — Z23 NEED FOR PNEUMOCOCCAL VACCINE: ICD-10-CM

## 2023-11-08 DIAGNOSIS — Z00.00 WELL ADULT EXAM: Primary | ICD-10-CM

## 2023-11-08 DIAGNOSIS — Z12.4 CERVICAL CANCER SCREENING: ICD-10-CM

## 2023-11-08 DIAGNOSIS — Z00.00 WELL ADULT EXAM: ICD-10-CM

## 2023-11-08 DIAGNOSIS — Z23 NEED FOR INFLUENZA VACCINATION: ICD-10-CM

## 2023-11-08 DIAGNOSIS — Z13.820 OSTEOPOROSIS SCREENING: ICD-10-CM

## 2023-11-08 DIAGNOSIS — R79.89 ABNORMAL TSH: ICD-10-CM

## 2023-11-08 DIAGNOSIS — Z78.0 POST-MENOPAUSAL: ICD-10-CM

## 2023-11-08 LAB
BASOPHILS # BLD: 0 K/UL (ref 0–0.2)
BASOPHILS NFR BLD: 1.1 %
DEPRECATED RDW RBC AUTO: 13.5 % (ref 12.4–15.4)
EOSINOPHIL # BLD: 0 K/UL (ref 0–0.6)
EOSINOPHIL NFR BLD: 1.2 %
HCT VFR BLD AUTO: 43.4 % (ref 36–48)
HGB BLD-MCNC: 14.9 G/DL (ref 12–16)
LYMPHOCYTES # BLD: 1.7 K/UL (ref 1–5.1)
LYMPHOCYTES NFR BLD: 40.5 %
MCH RBC QN AUTO: 31.3 PG (ref 26–34)
MCHC RBC AUTO-ENTMCNC: 34.3 G/DL (ref 31–36)
MCV RBC AUTO: 91.2 FL (ref 80–100)
MONOCYTES # BLD: 0.3 K/UL (ref 0–1.3)
MONOCYTES NFR BLD: 6.6 %
NEUTROPHILS # BLD: 2.1 K/UL (ref 1.7–7.7)
NEUTROPHILS NFR BLD: 50.6 %
PLATELET # BLD AUTO: 227 K/UL (ref 135–450)
PMV BLD AUTO: 9.5 FL (ref 5–10.5)
RBC # BLD AUTO: 4.76 M/UL (ref 4–5.2)
WBC # BLD AUTO: 4.1 K/UL (ref 4–11)

## 2023-11-08 PROCEDURE — 90472 IMMUNIZATION ADMIN EACH ADD: CPT | Performed by: NURSE PRACTITIONER

## 2023-11-08 PROCEDURE — 99397 PER PM REEVAL EST PAT 65+ YR: CPT | Performed by: NURSE PRACTITIONER

## 2023-11-08 PROCEDURE — 90694 VACC AIIV4 NO PRSRV 0.5ML IM: CPT | Performed by: NURSE PRACTITIONER

## 2023-11-08 PROCEDURE — 90677 PCV20 VACCINE IM: CPT | Performed by: NURSE PRACTITIONER

## 2023-11-08 PROCEDURE — 90471 IMMUNIZATION ADMIN: CPT | Performed by: NURSE PRACTITIONER

## 2023-11-08 NOTE — PATIENT INSTRUCTIONS
Mediterranean diet: Choose this heart-healthy diet option  The Mediterranean diet is a heart-healthy eating plan combining elements of Mediterranean-style cooking. Here's how to adopt the Mediterranean diet. If you're looking for a heart-healthy eating plan, the Mediterranean diet might be right for you. The Mediterranean diet incorporates the basics of healthy eating -- plus a splash of flavorful olive oil and perhaps a glass of red wine -- among other components characterizing the traditional cooking style of countries bordering the [facility]. Most healthy diets include fruits, vegetables, fish and whole grains, and limit unhealthy fats. While these parts of a healthy diet remain tried-and-true, subtle variations or differences in proportions of certain foods may make a difference in your risk of heart disease. Benefits of the [facility] has shown that the traditional Mediterranean diet reduces the risk of heart disease. In fact, a recent analysis of more than 1.5 million healthy adults demonstrated that following a Mediterranean diet was associated with a reduced risk of overall and cardiovascular mortality, a reduced incidence of cancer and cancer mortality, and a reduced incidence of Parkinson's and Alzheimer's diseases. For this reason, most if not all major scientific organizations encourage healthy adults to adapt a style of eating like that of the [address] [place] [address] Street for prevention of major chronic diseases.      Key components of the Mediterranean diet  The Mediterranean diet emphasizes:   Getting plenty of exercise   Eating primarily plant-based foods, such as fruits and vegetables, whole grains, legumes and nuts   Replacing butter with healthy fats such as olive oil and canola oil   Using herbs and spices instead of salt to flavor foods   Limiting red meat to no more than a few times a month   Eating fish and poultry at least twice a week   Drinking red wine in moderation

## 2023-11-08 NOTE — PROGRESS NOTES
History and Physical      LYLE RODRIGUEZ Mensing  YOB: 1955    Date of Service:  11/8/2023    Chief Complaint:   Addison Pruett is a 79 y.o. female who presents for complete physical examination. Chief Complaint   Patient presents with    Annual Exam     Has question about bone scan. Would like motion patches for vacation next summer       HPI: Patient is here for her preventative physical and blood work. History of basal cell skin cancer excision. Follows with dermatologist yearly- Dr. Janine Joya. History of chronic mid to low back for multiple years. Takes tizanidine 4 mg TID PRN and alternating between tylenol and ibuprofen. Reports that she will take for a couple of days and then pain will resolve. Pain will come and go. Has completed PT about 10 years ago. Does not do exercises at home. Will stiffen when sitting for too long but if she gets up and walks around pain will resolve. Sometimes pain will radiate down right leg. Denies numbness or weakness. Xray lumbar spine 9/2021 showed lumbar DDD. Rosacea- on metronidazole cream every Wed and Sat. Tolerating well. Allergies- on zyrtec daily which controls allergies      GYN- Dr. Asif Frost every 3 years. Last pap smear was 3 years ago. Denies previous abnormal pap smears.       Exercise- just started exercise program- video exercises daily   Diet- nothing specific     Wt Readings from Last 3 Encounters:   11/08/23 85.8 kg (189 lb 3.2 oz)   10/11/23 81.6 kg (180 lb)   07/06/23 85.8 kg (189 lb 3.2 oz)     BP Readings from Last 3 Encounters:   11/08/23 112/72   07/06/23 128/76   06/08/23 134/76       Patient Active Problem List   Diagnosis    IGT (impaired glucose tolerance)    Asymptomatic varicose veins    History of nonmelanoma skin cancer    Irritable bowel syndrome    Other benign neoplasm of connective and other soft tissue of unspecified site    Rosacea    Musculoskeletal back pain    Spondylosis of lumbar region without

## 2023-11-09 LAB
ALBUMIN SERPL-MCNC: 4.6 G/DL (ref 3.4–5)
ALBUMIN/GLOB SERPL: 2 {RATIO} (ref 1.1–2.2)
ALP SERPL-CCNC: 97 U/L (ref 40–129)
ALT SERPL-CCNC: 38 U/L (ref 10–40)
ANION GAP SERPL CALCULATED.3IONS-SCNC: 12 MMOL/L (ref 3–16)
AST SERPL-CCNC: 39 U/L (ref 15–37)
BILIRUB SERPL-MCNC: 0.6 MG/DL (ref 0–1)
BUN SERPL-MCNC: 13 MG/DL (ref 7–20)
CALCIUM SERPL-MCNC: 9.8 MG/DL (ref 8.3–10.6)
CHLORIDE SERPL-SCNC: 105 MMOL/L (ref 99–110)
CHOLEST SERPL-MCNC: 183 MG/DL (ref 0–199)
CO2 SERPL-SCNC: 24 MMOL/L (ref 21–32)
CREAT SERPL-MCNC: 0.7 MG/DL (ref 0.6–1.2)
GFR SERPLBLD CREATININE-BSD FMLA CKD-EPI: >60 ML/MIN/{1.73_M2}
GLUCOSE SERPL-MCNC: 88 MG/DL (ref 70–99)
HDLC SERPL-MCNC: 53 MG/DL (ref 40–60)
LDLC SERPL CALC-MCNC: 109 MG/DL
POTASSIUM SERPL-SCNC: 4.5 MMOL/L (ref 3.5–5.1)
PROT SERPL-MCNC: 6.9 G/DL (ref 6.4–8.2)
SODIUM SERPL-SCNC: 141 MMOL/L (ref 136–145)
TRIGL SERPL-MCNC: 106 MG/DL (ref 0–150)
TSH SERPL DL<=0.005 MIU/L-ACNC: 3.75 UIU/ML (ref 0.27–4.2)
VLDLC SERPL CALC-MCNC: 21 MG/DL

## 2023-11-10 ENCOUNTER — HOSPITAL ENCOUNTER (OUTPATIENT)
Dept: WOMENS IMAGING | Age: 68
Discharge: HOME | End: 2023-11-10
Payer: COMMERCIAL

## 2023-11-10 DIAGNOSIS — Z78.0 POST-MENOPAUSAL: ICD-10-CM

## 2023-11-10 DIAGNOSIS — Z13.820 OSTEOPOROSIS SCREENING: ICD-10-CM

## 2023-11-10 PROCEDURE — 77080 DXA BONE DENSITY AXIAL: CPT

## 2023-11-13 PROBLEM — M85.89 OSTEOPENIA OF MULTIPLE SITES: Status: ACTIVE | Noted: 2023-11-13

## 2024-01-23 ENCOUNTER — OFFICE VISIT (OUTPATIENT)
Dept: FAMILY MEDICINE CLINIC | Age: 69
End: 2024-01-23
Payer: COMMERCIAL

## 2024-01-23 VITALS
WEIGHT: 190 LBS | OXYGEN SATURATION: 97 % | TEMPERATURE: 98 F | HEART RATE: 65 BPM | BODY MASS INDEX: 31.65 KG/M2 | HEIGHT: 65 IN | SYSTOLIC BLOOD PRESSURE: 134 MMHG | DIASTOLIC BLOOD PRESSURE: 86 MMHG | RESPIRATION RATE: 18 BRPM

## 2024-01-23 DIAGNOSIS — C44.321 SQUAMOUS CELL CARCINOMA OF SKIN OF NOSE: ICD-10-CM

## 2024-01-23 DIAGNOSIS — Z01.818 PREOP EXAMINATION: Primary | ICD-10-CM

## 2024-01-23 PROBLEM — R79.89 ABNORMAL TSH: Status: RESOLVED | Noted: 2022-11-09 | Resolved: 2024-01-23

## 2024-01-23 PROCEDURE — 93000 ELECTROCARDIOGRAM COMPLETE: CPT | Performed by: NURSE PRACTITIONER

## 2024-01-23 PROCEDURE — 99213 OFFICE O/P EST LOW 20 MIN: CPT | Performed by: NURSE PRACTITIONER

## 2024-01-23 ASSESSMENT — PATIENT HEALTH QUESTIONNAIRE - PHQ9
SUM OF ALL RESPONSES TO PHQ QUESTIONS 1-9: 0
SUM OF ALL RESPONSES TO PHQ QUESTIONS 1-9: 0
2. FEELING DOWN, DEPRESSED OR HOPELESS: 0
SUM OF ALL RESPONSES TO PHQ QUESTIONS 1-9: 0
SUM OF ALL RESPONSES TO PHQ9 QUESTIONS 1 & 2: 0
1. LITTLE INTEREST OR PLEASURE IN DOING THINGS: 0
SUM OF ALL RESPONSES TO PHQ QUESTIONS 1-9: 0

## 2024-01-23 NOTE — PROGRESS NOTES
Preoperative Consultation      Jmai RODRIGUEZ Mensing  YOB: 1955    Date of Service:  1/23/2024    Vitals:    01/23/24 0835   BP: 134/86   Site: Right Upper Arm   Position: Sitting   Cuff Size: Medium Adult   Pulse: 65   Resp: 18   Temp: 98 °F (36.7 °C)   TempSrc: Oral   SpO2: 97%   Weight: 86.2 kg (190 lb)   Height: 1.651 m (5' 5\")      Wt Readings from Last 2 Encounters:   01/23/24 86.2 kg (190 lb)   11/08/23 85.8 kg (189 lb 3.2 oz)     BP Readings from Last 3 Encounters:   01/23/24 134/86   11/08/23 112/72   07/06/23 128/76        Chief Complaint   Patient presents with    Pre-op Exam     2/12/2024. Skin surgery for nose. Dr Caden Levine MD.      Allergies   Allergen Reactions    Seasonal      Outpatient Medications Marked as Taking for the 1/23/24 encounter (Office Visit) with Shanda Sanon APRN - CNP   Medication Sig Dispense Refill    Calcium Acetate, Phos Binder, (CALCIUM ACETATE PO) Take 600 mg by mouth 2 times daily      tiZANidine (ZANAFLEX) 4 MG tablet Take 1 tablet by mouth 3 times daily as needed (muscle spasm) 30 tablet 0    metroNIDAZOLE (METROCREAM) 0.75 % cream APPLY TOPICALLY 2 TIMES DAILY APPLY TOPICALLY 2 TIMES DAILY. 45 g 2    Vitamin D, Cholecalciferol, 25 MCG (1000 UT) CAPS Take 2,000 Units by mouth 2 times daily      Cetirizine HCl (ZYRTEC ALLERGY PO) Take 1 tablet by mouth daily          This patient presents to the office today for a preoperative consultation at the request of surgeon, Dr. Levine, who plans on performing repair left nasal alar defect on February 12, 2024 at [facility].  The current problem began 3 months ago, and symptoms have been worsening with time.  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 Planning on having MOHS and then repair.     Denies history of ADRIANNA.     Planned anesthesia: MAC   Known anesthesia problems: None   Bleeding risk: No recent or remote history of abnormal

## 2024-10-04 ENCOUNTER — TELEPHONE (OUTPATIENT)
Dept: FAMILY MEDICINE CLINIC | Age: 69
End: 2024-10-04

## 2024-10-04 ENCOUNTER — OFFICE VISIT (OUTPATIENT)
Age: 69
End: 2024-10-04

## 2024-10-04 VITALS
SYSTOLIC BLOOD PRESSURE: 150 MMHG | OXYGEN SATURATION: 96 % | WEIGHT: 187 LBS | DIASTOLIC BLOOD PRESSURE: 75 MMHG | BODY MASS INDEX: 31.12 KG/M2 | TEMPERATURE: 97.5 F | HEART RATE: 61 BPM

## 2024-10-04 DIAGNOSIS — R42 DIZZINESS: Primary | ICD-10-CM

## 2024-10-04 LAB
ALBUMIN: 4.3 G/DL
ALP BLD-CCNC: 80 U/L
ALT SERPL-CCNC: 22 U/L
ANION GAP SERPL CALCULATED.3IONS-SCNC: 8 MMOL/L
AST SERPL-CCNC: 26 U/L
B-TYPE NATRIURETIC PEPTIDE: 15 PG/ML
BASOPHILS ABSOLUTE: 0.05 /ΜL
BASOPHILS ABSOLUTE: NORMAL
BASOPHILS RELATIVE PERCENT: 0.8 %
BASOPHILS RELATIVE PERCENT: NORMAL
BILIRUB SERPL-MCNC: 0.7 MG/DL (ref 0.1–1.4)
BILIRUBIN, URINE: NEGATIVE
BLOOD, URINE: NEGATIVE
BUN BLDV-MCNC: 12 MG/DL
CALCIUM SERPL-MCNC: 9.4 MG/DL
CHLORIDE BLD-SCNC: 108 MMOL/L
CLARITY, UA: NORMAL
CO2: 24 MMOL/L
COLOR, UA: NORMAL
CREAT SERPL-MCNC: 0.76 MG/DL
EOSINOPHILS ABSOLUTE: NORMAL
EOSINOPHILS ABSOLUTE: NORMAL
EOSINOPHILS RELATIVE PERCENT: 0.2 %
EOSINOPHILS RELATIVE PERCENT: NORMAL
GFR, ESTIMATED: 86
GLUCOSE BLD-MCNC: 115 MG/DL
GLUCOSE URINE: NORMAL
HCT VFR BLD CALC: 43.8 % (ref 36–46)
HCT VFR BLD CALC: NORMAL %
HEMOGLOBIN: 14.4 G/DL (ref 12–16)
HEMOGLOBIN: NORMAL
INR BLD: 1
KETONES, URINE: NEGATIVE
LEUKOCYTE ESTERASE, URINE: NEGATIVE
LYMPHOCYTES ABSOLUTE: 1.28 /ΜL
LYMPHOCYTES ABSOLUTE: NORMAL
LYMPHOCYTES RELATIVE PERCENT: 20.8 %
LYMPHOCYTES RELATIVE PERCENT: NORMAL
MCH RBC QN AUTO: 30.1 PG
MCH RBC QN AUTO: NORMAL PG
MCHC RBC AUTO-ENTMCNC: 32.9 G/DL
MCHC RBC AUTO-ENTMCNC: NORMAL G/DL
MCV RBC AUTO: 91.4 FL
MCV RBC AUTO: NORMAL FL
MONOCYTES ABSOLUTE: 0.27 /ΜL
MONOCYTES ABSOLUTE: NORMAL
MONOCYTES RELATIVE PERCENT: 4.4 %
MONOCYTES RELATIVE PERCENT: NORMAL
NEUTROPHILS ABSOLUTE: 4.54 /ΜL
NEUTROPHILS ABSOLUTE: NORMAL
NEUTROPHILS RELATIVE PERCENT: 73.6 %
NEUTROPHILS RELATIVE PERCENT: NORMAL
NITRITE, URINE: NEGATIVE
PDW BLD-RTO: NORMAL %
PH UA: 7.5 (ref 4.5–8)
PLATELET # BLD: NORMAL 10*3/UL
PLATELET # BLD: NORMAL 10*3/UL
PMV BLD AUTO: 10.5 FL
PMV BLD AUTO: NORMAL FL
POTASSIUM SERPL-SCNC: 3.9 MMOL/L
PROTEIN UA: NEGATIVE
PROTHROMBIN TIME: 12.2
RBC # BLD: 4.79 10^6/ΜL
RBC # BLD: NORMAL 10*6/UL
SODIUM BLD-SCNC: 140 MMOL/L
SPECIFIC GRAVITY UA: 1.01 (ref 1–1.03)
TOTAL PROTEIN: 7.3 G/DL (ref 6.4–8.2)
TSH SERPL DL<=0.05 MIU/L-ACNC: 1.8 UIU/ML
UROBILINOGEN, URINE: NORMAL
WBC # BLD: 6.16 10^3/ML
WBC # BLD: NORMAL 10*3/UL

## 2024-10-04 ASSESSMENT — ENCOUNTER SYMPTOMS: VOMITING: 1

## 2024-10-04 NOTE — PATIENT INSTRUCTIONS
Advise to go to the emergency department to get evaluated for stroke due to the symptoms of a slight left facial droop, high blood pressure and dizziness.   Pt wants to go to [facility] ([facility]) and will call report spouse will be driving

## 2024-10-04 NOTE — PROGRESS NOTES
Jami Brunson (:  1955) is a 69 y.o. female,New patient, here for evaluation of the following chief complaint(s):  Dizziness, Emesis (Symptoms started this morning.), and Care Coordination (Sent to [facility] report called to receiving facility and report given and concerns)      ASSESSMENT/PLAN:    ICD-10-CM    1. Dizziness  R42           Dx Diff: CVA, inner ear  Education and handout provided on diagnosis and management of symptoms.   AVS reviewed with patient. Follow up as needed in UC or with PCP for new or worsening symptoms.   Return if symptoms worsen or fail to improve.  Discussed with pt, daughter and spouse about concerns in the exam with being hypertensive and having a subtle facial droop concern for a CVA and recommend being seen in the ED pt is ataxic while trying to walk and all are in agreement to be evaluated in the ED Will go to [facility] and will call report while on the phone spouse changed mind and will go to [facility] instead informed MW of decision and called [facility] and report given    SUBJECTIVE/OBJECTIVE:  Patient presents today with complaints of Dizziness with nausea and vomiting that started this morning  Pt is not normally hypertensive and takes no medication for hypertension      History provided by:  Patient   used: No    Dizziness  Associated symptoms: vomiting    Emesis   Associated symptoms include dizziness.       Vitals:    10/04/24 1647 10/04/24 1653 10/04/24 1714   BP: (!) 168/96 (!) 162/92 (!) 150/75   Site: Left Upper Arm Right Upper Arm    Position: Sitting Sitting    Cuff Size: Large Adult Medium Adult    Pulse: 61     Temp: 97.5 °F (36.4 °C)     TempSrc: Oral     SpO2: 96%     Weight: 84.8 kg (187 lb)         Review of Systems   Gastrointestinal:  Positive for vomiting.   Neurological:  Positive for dizziness.       Physical Exam  Constitutional:       Appearance: Normal appearance.   HENT:      Head: Normocephalic.      Nose: Nose

## 2024-10-04 NOTE — TELEPHONE ENCOUNTER
Patient called and is feeling lightheaded.  Symptoms are positional.  No confusion, numbness or slurred speech.  No weakness.  Worse if she tilts her head to the right. Advised patient to go to [facility].  Patient agreed and neighbor is driving her.

## 2024-10-07 ENCOUNTER — TELEPHONE (OUTPATIENT)
Dept: FAMILY MEDICINE CLINIC | Age: 69
End: 2024-10-07

## 2024-10-11 ENCOUNTER — OFFICE VISIT (OUTPATIENT)
Dept: FAMILY MEDICINE CLINIC | Age: 69
End: 2024-10-11
Payer: COMMERCIAL

## 2024-10-11 VITALS
HEART RATE: 56 BPM | RESPIRATION RATE: 18 BRPM | WEIGHT: 184.2 LBS | DIASTOLIC BLOOD PRESSURE: 72 MMHG | OXYGEN SATURATION: 99 % | SYSTOLIC BLOOD PRESSURE: 120 MMHG | HEIGHT: 65 IN | BODY MASS INDEX: 30.69 KG/M2 | TEMPERATURE: 98.2 F

## 2024-10-11 DIAGNOSIS — I10 ESSENTIAL HYPERTENSION: ICD-10-CM

## 2024-10-11 DIAGNOSIS — R42 VERTIGO: Primary | ICD-10-CM

## 2024-10-11 DIAGNOSIS — Z23 NEED FOR INFLUENZA VACCINATION: ICD-10-CM

## 2024-10-11 PROCEDURE — 3078F DIAST BP <80 MM HG: CPT | Performed by: NURSE PRACTITIONER

## 2024-10-11 PROCEDURE — 90653 IIV ADJUVANT VACCINE IM: CPT | Performed by: NURSE PRACTITIONER

## 2024-10-11 PROCEDURE — 90471 IMMUNIZATION ADMIN: CPT | Performed by: NURSE PRACTITIONER

## 2024-10-11 PROCEDURE — 99214 OFFICE O/P EST MOD 30 MIN: CPT | Performed by: NURSE PRACTITIONER

## 2024-10-11 PROCEDURE — 1123F ACP DISCUSS/DSCN MKR DOCD: CPT | Performed by: NURSE PRACTITIONER

## 2024-10-11 PROCEDURE — 3074F SYST BP LT 130 MM HG: CPT | Performed by: NURSE PRACTITIONER

## 2024-10-11 RX ORDER — MECLIZINE HYDROCHLORIDE 25 MG/1
25 TABLET ORAL EVERY 6 HOURS PRN
COMMUNITY
Start: 2024-10-04

## 2024-10-11 RX ORDER — LOSARTAN POTASSIUM 25 MG/1
25 TABLET ORAL DAILY
COMMUNITY
Start: 2024-10-04

## 2024-10-11 RX ORDER — ONDANSETRON 4 MG/1
TABLET, ORALLY DISINTEGRATING ORAL
COMMUNITY
Start: 2024-10-05

## 2024-10-11 SDOH — ECONOMIC STABILITY: FOOD INSECURITY: WITHIN THE PAST 12 MONTHS, THE FOOD YOU BOUGHT JUST DIDN'T LAST AND YOU DIDN'T HAVE MONEY TO GET MORE.: NEVER TRUE

## 2024-10-11 SDOH — ECONOMIC STABILITY: FOOD INSECURITY: WITHIN THE PAST 12 MONTHS, YOU WORRIED THAT YOUR FOOD WOULD RUN OUT BEFORE YOU GOT MONEY TO BUY MORE.: NEVER TRUE

## 2024-10-11 SDOH — ECONOMIC STABILITY: INCOME INSECURITY: HOW HARD IS IT FOR YOU TO PAY FOR THE VERY BASICS LIKE FOOD, HOUSING, MEDICAL CARE, AND HEATING?: NOT HARD AT ALL

## 2024-10-11 NOTE — PROGRESS NOTES
rub. No gallop.   Pulmonary:      Effort: Pulmonary effort is normal. No respiratory distress.      Breath sounds: Normal breath sounds.   Musculoskeletal:      Cervical back: Neck supple.      Right lower leg: No edema.      Left lower leg: No edema.   Lymphadenopathy:      Cervical: No cervical adenopathy.   Skin:     General: Skin is warm and dry.   Neurological:      Mental Status: She is alert and oriented to person, place, and time.   Psychiatric:         Behavior: Behavior normal.         Thought Content: Thought content normal.         Judgment: Judgment normal.         Assessmentand Plan  Jami \"Dora\" was seen today for follow-up.    Diagnoses and all orders for this visit:    Vertigo  Symptoms improved.   Should stay hydrated with water  [place] exercises given     Essential hypertension  BP controlled in office today. Continue to monitor BP daily and take losartan 25 mg if BP is above 130/90    Need for influenza vaccination  -     Influenza, FLUAD Trivalent, (age 65 y+), IM, Preservative Free, 0.5mL        Return in about 4 weeks (around 11/8/2024), or if symptoms worsen or fail to improve, for chronic conditions.

## 2024-10-13 PROBLEM — I10 ESSENTIAL HYPERTENSION: Status: ACTIVE | Noted: 2024-10-13

## 2024-10-13 PROBLEM — R42 VERTIGO: Status: ACTIVE | Noted: 2024-10-13

## 2024-10-13 PROBLEM — E04.1 RIGHT THYROID NODULE: Status: ACTIVE | Noted: 2024-10-13

## 2024-11-08 ENCOUNTER — OFFICE VISIT (OUTPATIENT)
Dept: FAMILY MEDICINE CLINIC | Age: 69
End: 2024-11-08

## 2024-11-08 VITALS
RESPIRATION RATE: 18 BRPM | HEIGHT: 65 IN | OXYGEN SATURATION: 97 % | BODY MASS INDEX: 30.92 KG/M2 | WEIGHT: 185.6 LBS | TEMPERATURE: 98.2 F | DIASTOLIC BLOOD PRESSURE: 70 MMHG | SYSTOLIC BLOOD PRESSURE: 128 MMHG | HEART RATE: 61 BPM

## 2024-11-08 DIAGNOSIS — E04.1 RIGHT THYROID NODULE: ICD-10-CM

## 2024-11-08 DIAGNOSIS — I10 ESSENTIAL HYPERTENSION: Primary | ICD-10-CM

## 2024-11-08 ASSESSMENT — ENCOUNTER SYMPTOMS
COUGH: 0
SHORTNESS OF BREATH: 0
ABDOMINAL PAIN: 0

## 2024-11-08 NOTE — PROGRESS NOTES
tablet by mouth daily      meclizine (ANTIVERT) 25 MG tablet Take 1 tablet by mouth every 6 hours as needed      ondansetron (ZOFRAN-ODT) 4 MG disintegrating tablet DISSOLVE 1 TABLET (4 MG) ON TONGUE EVERY 6 HOURS AS NEEDED FOR NAUSEA FOR UP TO 3 DAYS.      Calcium Acetate, Phos Binder, (CALCIUM ACETATE PO) Take 600 mg by mouth 2 times daily      tiZANidine (ZANAFLEX) 4 MG tablet Take 1 tablet by mouth 3 times daily as needed (muscle spasm) 30 tablet 0    metroNIDAZOLE (METROCREAM) 0.75 % cream APPLY TOPICALLY 2 TIMES DAILY APPLY TOPICALLY 2 TIMES DAILY. 45 g 2    Vitamin D, Cholecalciferol, 25 MCG (1000 UT) CAPS Take 2 capsules by mouth 2 times daily      scopolamine (TRANSDERM-SCOP) transdermal patch Place 1 patch onto the skin every 72 hours (Patient not taking: Reported on 11/8/2024) 10 patch 0    Cetirizine HCl (ZYRTEC ALLERGY PO) Take 1 tablet by mouth daily        No current facility-administered medications for this visit.       Allergies   Allergen Reactions    Seasonal        Review of Systems   Constitutional:  Negative for activity change and fever.   Respiratory:  Negative for cough and shortness of breath.    Cardiovascular:  Negative for chest pain.   Gastrointestinal:  Negative for abdominal pain.   Neurological:  Positive for dizziness. Negative for headaches.       Vitals:    11/08/24 0935   BP: 128/70   Site: Right Upper Arm   Position: Sitting   Cuff Size: Medium Adult   Pulse: 61   Resp: 18   Temp: 98.2 °F (36.8 °C)   TempSrc: Oral   SpO2: 97%   Weight: 84.2 kg (185 lb 9.6 oz)   Height: 1.651 m (5' 5\")       Body mass index is 30.89 kg/m².     Wt Readings from Last 3 Encounters:   11/08/24 84.2 kg (185 lb 9.6 oz)   10/11/24 83.6 kg (184 lb 3.2 oz)   10/04/24 84.8 kg (187 lb)       BP Readings from Last 3 Encounters:   11/08/24 128/70   10/11/24 120/72   10/04/24 (!) 150/75       Physical Exam  Vitals and nursing note reviewed.   Constitutional:       General: She is not in acute distress.

## 2024-11-21 DIAGNOSIS — E04.1 RIGHT THYROID NODULE: Primary | ICD-10-CM

## 2025-01-08 ENCOUNTER — HOSPITAL ENCOUNTER (OUTPATIENT)
Dept: WOMENS IMAGING | Age: 70
Discharge: HOME | End: 2025-01-08
Payer: MEDICARE

## 2025-01-08 VITALS — HEIGHT: 65 IN | WEIGHT: 182 LBS | BODY MASS INDEX: 30.32 KG/M2

## 2025-01-08 DIAGNOSIS — Z12.31 SCREENING MAMMOGRAM FOR BREAST CANCER: ICD-10-CM

## 2025-01-08 PROCEDURE — 77063 BREAST TOMOSYNTHESIS BI: CPT

## 2025-01-14 ENCOUNTER — OFFICE VISIT (OUTPATIENT)
Dept: ENDOCRINOLOGY | Age: 70
End: 2025-01-14
Payer: MEDICARE

## 2025-01-14 VITALS
SYSTOLIC BLOOD PRESSURE: 131 MMHG | RESPIRATION RATE: 14 BRPM | WEIGHT: 184 LBS | OXYGEN SATURATION: 98 % | BODY MASS INDEX: 30.62 KG/M2 | DIASTOLIC BLOOD PRESSURE: 83 MMHG | HEART RATE: 78 BPM

## 2025-01-14 DIAGNOSIS — R79.89 HIGH SERUM THYROID STIMULATING HORMONE (TSH): ICD-10-CM

## 2025-01-14 DIAGNOSIS — E04.1 THYROID NODULE: Primary | ICD-10-CM

## 2025-01-14 PROCEDURE — 1123F ACP DISCUSS/DSCN MKR DOCD: CPT | Performed by: INTERNAL MEDICINE

## 2025-01-14 PROCEDURE — 3079F DIAST BP 80-89 MM HG: CPT | Performed by: INTERNAL MEDICINE

## 2025-01-14 PROCEDURE — 1159F MED LIST DOCD IN RCRD: CPT | Performed by: INTERNAL MEDICINE

## 2025-01-14 PROCEDURE — 3075F SYST BP GE 130 - 139MM HG: CPT | Performed by: INTERNAL MEDICINE

## 2025-01-14 PROCEDURE — 99204 OFFICE O/P NEW MOD 45 MIN: CPT | Performed by: INTERNAL MEDICINE

## 2025-01-14 NOTE — PROGRESS NOTES
Subjective:      68 y/o WF who is here for thyroid evaluation.     She had a CT head/neck which showed a thyroid nodule    Thyroid USG 11/24  Right thyroid lobe measures 4.0 x 1.2 x 2.0 cm.   Echotexture: homogenous   Nodules:   Posterior inferior pole solid isoechoic nodule identified with dimensions measuring 1.5 x 1.0 x 1.3 cm. Imaging characteristics are most compatible with TI-RADS Category TR3 mildly suspicious lesion.     Left thyroid lobe measures 2.8 x 0.9 x 1.4 cm.   Echotexture: homogenous   Nodules:   None visualized.     Thyroid isthmus measures 0.2 cm.   Nodules:   None visualized.       IMPRESSION:     Right thyroid TR 3 inferior pole mildly suspicious nodule.       Current complaints: see HPI  History of obstructive symptoms:  difficulty swallowing no    History of radiation to patient's neck:   no  Recent iodine exposure:  no  Family history includes no  Family history of thyroid cancer:  no      Mild controlled    No worsening factors    She has a h/o high TSH , repeat normal    11/22 TSH 5  11/23 TSH 3.75  10/24 TSH 1.8    Past Medical History:   Diagnosis Date    Elevated TSH     History of skin cancer     IBS (irritable bowel syndrome)     Rosacea     Seasonal allergies     Sty      Past Surgical History:   Procedure Laterality Date    COLONOSCOPY N/A 1/29/2021    COLORECTAL CANCER SCREENING, NOT HIGH RISK performed by Freddie Baeza MD at [facility] ENDOSCOPY    SKIN CANCER EXCISION      face, x3     Current Outpatient Medications   Medication Sig Dispense Refill    losartan (COZAAR) 25 MG tablet Take 1 tablet by mouth daily      meclizine (ANTIVERT) 25 MG tablet Take 1 tablet by mouth every 6 hours as needed      ondansetron (ZOFRAN-ODT) 4 MG disintegrating tablet DISSOLVE 1 TABLET (4 MG) ON TONGUE EVERY 6 HOURS AS NEEDED FOR NAUSEA FOR UP TO 3 DAYS.      scopolamine (TRANSDERM-SCOP) transdermal patch Place 1 patch onto the skin every 72 hours (Patient not taking: Reported on 11/8/2024) 10 patch

## 2025-02-11 SDOH — ECONOMIC STABILITY: FOOD INSECURITY: WITHIN THE PAST 12 MONTHS, YOU WORRIED THAT YOUR FOOD WOULD RUN OUT BEFORE YOU GOT MONEY TO BUY MORE.: NEVER TRUE

## 2025-02-11 SDOH — ECONOMIC STABILITY: FOOD INSECURITY: WITHIN THE PAST 12 MONTHS, THE FOOD YOU BOUGHT JUST DIDN'T LAST AND YOU DIDN'T HAVE MONEY TO GET MORE.: NEVER TRUE

## 2025-02-11 SDOH — ECONOMIC STABILITY: INCOME INSECURITY: IN THE LAST 12 MONTHS, WAS THERE A TIME WHEN YOU WERE NOT ABLE TO PAY THE MORTGAGE OR RENT ON TIME?: NO

## 2025-02-11 ASSESSMENT — PATIENT HEALTH QUESTIONNAIRE - PHQ9
1. LITTLE INTEREST OR PLEASURE IN DOING THINGS: NOT AT ALL
1. LITTLE INTEREST OR PLEASURE IN DOING THINGS: NOT AT ALL
SUM OF ALL RESPONSES TO PHQ QUESTIONS 1-9: 0
2. FEELING DOWN, DEPRESSED OR HOPELESS: NOT AT ALL
SUM OF ALL RESPONSES TO PHQ QUESTIONS 1-9: 0
SUM OF ALL RESPONSES TO PHQ QUESTIONS 1-9: 0
SUM OF ALL RESPONSES TO PHQ9 QUESTIONS 1 & 2: 0
SUM OF ALL RESPONSES TO PHQ9 QUESTIONS 1 & 2: 0
SUM OF ALL RESPONSES TO PHQ QUESTIONS 1-9: 0
2. FEELING DOWN, DEPRESSED OR HOPELESS: NOT AT ALL

## 2025-02-12 ENCOUNTER — OFFICE VISIT (OUTPATIENT)
Dept: FAMILY MEDICINE CLINIC | Age: 70
End: 2025-02-12

## 2025-02-12 VITALS
OXYGEN SATURATION: 96 % | BODY MASS INDEX: 30.69 KG/M2 | TEMPERATURE: 98 F | SYSTOLIC BLOOD PRESSURE: 124 MMHG | RESPIRATION RATE: 18 BRPM | DIASTOLIC BLOOD PRESSURE: 76 MMHG | HEIGHT: 65 IN | HEART RATE: 63 BPM | WEIGHT: 184.2 LBS

## 2025-02-12 DIAGNOSIS — I10 ESSENTIAL HYPERTENSION: ICD-10-CM

## 2025-02-12 DIAGNOSIS — E04.1 RIGHT THYROID NODULE: ICD-10-CM

## 2025-02-12 DIAGNOSIS — I10 ESSENTIAL HYPERTENSION: Primary | ICD-10-CM

## 2025-02-12 DIAGNOSIS — E66.811 OBESITY, CLASS I, BMI 30-34.9: ICD-10-CM

## 2025-02-12 LAB
ALBUMIN SERPL-MCNC: 4.5 G/DL (ref 3.4–5)
ALBUMIN/GLOB SERPL: 1.8 {RATIO} (ref 1.1–2.2)
ALP SERPL-CCNC: 93 U/L (ref 40–129)
ALT SERPL-CCNC: 29 U/L (ref 10–40)
ANION GAP SERPL CALCULATED.3IONS-SCNC: 10 MMOL/L (ref 3–16)
AST SERPL-CCNC: 30 U/L (ref 15–37)
BILIRUB SERPL-MCNC: 0.5 MG/DL (ref 0–1)
BUN SERPL-MCNC: 13 MG/DL (ref 7–20)
CALCIUM SERPL-MCNC: 9.8 MG/DL (ref 8.3–10.6)
CHLORIDE SERPL-SCNC: 103 MMOL/L (ref 99–110)
CHOLEST SERPL-MCNC: 187 MG/DL (ref 0–199)
CO2 SERPL-SCNC: 28 MMOL/L (ref 21–32)
CREAT SERPL-MCNC: 0.9 MG/DL (ref 0.6–1.2)
GFR SERPLBLD CREATININE-BSD FMLA CKD-EPI: 70 ML/MIN/{1.73_M2}
GLUCOSE SERPL-MCNC: 95 MG/DL (ref 70–99)
HDLC SERPL-MCNC: 48 MG/DL (ref 40–60)
LDL CHOLESTEROL: 113 MG/DL
POTASSIUM SERPL-SCNC: 4.5 MMOL/L (ref 3.5–5.1)
PROT SERPL-MCNC: 7 G/DL (ref 6.4–8.2)
SODIUM SERPL-SCNC: 141 MMOL/L (ref 136–145)
TRIGL SERPL-MCNC: 130 MG/DL (ref 0–150)
VLDLC SERPL CALC-MCNC: 26 MG/DL

## 2025-02-12 RX ORDER — LOSARTAN POTASSIUM 25 MG/1
25 TABLET ORAL DAILY
Qty: 30 TABLET | Refills: 5 | Status: SHIPPED | OUTPATIENT
Start: 2025-02-12

## 2025-02-12 ASSESSMENT — ENCOUNTER SYMPTOMS
SHORTNESS OF BREATH: 0
COUGH: 0
ABDOMINAL PAIN: 0

## 2025-02-12 NOTE — PROGRESS NOTES
2/12/2025    This is a 70 y.o. female   Chief Complaint   Patient presents with    Weight Management     Would like to discuss weight management.    Hypertension     BP has been up and down.     Skin Problem     Has spot on both legs she is concerned about.    .    HPI    Hypertension:  Home blood pressure monitoring: Yes - 115-140/75-83.  She is adherent to a low sodium diet. Patient denies chest pain, shortness of breath, peripheral edema, and palpitations.  Antihypertensive medication side effects: no medication side effects noted.      Taking losartan if BP is is above SBP of 130. Taking 2-3 times per week. Denies recurrent dizziness.                                       Sodium (mmol/L)   Date Value   10/04/2024 140    BUN (mg/dL)   Date Value   10/04/2024 12    Glucose (mg/dL)   Date Value   10/04/2024 115      Potassium (mmol/L)   Date Value   10/04/2024 3.9    Creatinine (mg/dL)   Date Value   10/04/2024 0.76         Head CT 10/4 did not show any acute intracranial changes.   Did show incidental right thyroid nodule.   TSH normal at 1.8  Had evaluation with Ang for thyroid nodule. Planning on monitoring.      Patient Active Problem List   Diagnosis    IGT (impaired glucose tolerance)    Asymptomatic varicose veins    History of nonmelanoma skin cancer    Irritable bowel syndrome    Other benign neoplasm of connective and other soft tissue of unspecified site    Rosacea    Musculoskeletal back pain    Spondylosis of lumbar region without myelopathy or radiculopathy    Menopause    Osteopenia of multiple sites- DEXA scan 11/2023    Essential hypertension    Vertigo    Right thyroid nodule- u/s 11/2024 repeat in 1 year       Current Outpatient Medications   Medication Sig Dispense Refill    tiZANidine (ZANAFLEX) 4 MG tablet Take 1 tablet by mouth 3 times daily as needed (muscle spasm) 30 tablet 0    losartan (COZAAR) 25 MG tablet Take 1 tablet by mouth daily 30 tablet 5    Calcium Acetate, Phos Binder,

## 2025-08-13 SDOH — HEALTH STABILITY: PHYSICAL HEALTH: ON AVERAGE, HOW MANY DAYS PER WEEK DO YOU ENGAGE IN MODERATE TO STRENUOUS EXERCISE (LIKE A BRISK WALK)?: 3 DAYS

## 2025-08-13 SDOH — HEALTH STABILITY: PHYSICAL HEALTH: ON AVERAGE, HOW MANY MINUTES DO YOU ENGAGE IN EXERCISE AT THIS LEVEL?: 30 MIN

## 2025-08-13 ASSESSMENT — LIFESTYLE VARIABLES
HOW MANY STANDARD DRINKS CONTAINING ALCOHOL DO YOU HAVE ON A TYPICAL DAY: 1 OR 2
HOW OFTEN DO YOU HAVE A DRINK CONTAINING ALCOHOL: 2-4 TIMES A MONTH
HOW MANY STANDARD DRINKS CONTAINING ALCOHOL DO YOU HAVE ON A TYPICAL DAY: 1
HOW OFTEN DO YOU HAVE A DRINK CONTAINING ALCOHOL: 3
HOW OFTEN DO YOU HAVE SIX OR MORE DRINKS ON ONE OCCASION: 1

## 2025-08-13 ASSESSMENT — PATIENT HEALTH QUESTIONNAIRE - PHQ9
1. LITTLE INTEREST OR PLEASURE IN DOING THINGS: NOT AT ALL
SUM OF ALL RESPONSES TO PHQ QUESTIONS 1-9: 0
2. FEELING DOWN, DEPRESSED OR HOPELESS: NOT AT ALL

## 2025-08-22 ENCOUNTER — OFFICE VISIT (OUTPATIENT)
Dept: FAMILY MEDICINE CLINIC | Age: 70
End: 2025-08-22

## 2025-08-22 VITALS
HEIGHT: 65 IN | HEART RATE: 62 BPM | WEIGHT: 183.4 LBS | BODY MASS INDEX: 30.56 KG/M2 | DIASTOLIC BLOOD PRESSURE: 74 MMHG | SYSTOLIC BLOOD PRESSURE: 128 MMHG | TEMPERATURE: 97.9 F | RESPIRATION RATE: 18 BRPM | OXYGEN SATURATION: 96 %

## 2025-08-22 DIAGNOSIS — I10 ESSENTIAL HYPERTENSION: ICD-10-CM

## 2025-08-22 DIAGNOSIS — M85.89 OSTEOPENIA OF MULTIPLE SITES: ICD-10-CM

## 2025-08-22 DIAGNOSIS — E04.1 RIGHT THYROID NODULE: ICD-10-CM

## 2025-08-22 DIAGNOSIS — E66.811 OBESITY, CLASS I, BMI 30-34.9: ICD-10-CM

## 2025-08-22 DIAGNOSIS — Z00.00 WELCOME TO MEDICARE PREVENTIVE VISIT: Primary | ICD-10-CM

## 2025-08-22 DIAGNOSIS — Z13.0 SCREENING FOR DEFICIENCY ANEMIA: ICD-10-CM

## 2025-08-22 DIAGNOSIS — E78.00 HYPERCHOLESTEROLEMIA: ICD-10-CM

## 2025-08-22 LAB
25(OH)D3 SERPL-MCNC: 51.3 NG/ML
ALBUMIN SERPL-MCNC: 4.5 G/DL (ref 3.4–5)
ALBUMIN/GLOB SERPL: 1.7 {RATIO} (ref 1.1–2.2)
ALP SERPL-CCNC: 88 U/L (ref 40–129)
ALT SERPL-CCNC: 23 U/L (ref 10–40)
ANION GAP SERPL CALCULATED.3IONS-SCNC: 11 MMOL/L (ref 3–16)
AST SERPL-CCNC: 26 U/L (ref 15–37)
BASOPHILS # BLD: 0 K/UL (ref 0–0.2)
BASOPHILS NFR BLD: 0.8 %
BILIRUB SERPL-MCNC: 0.7 MG/DL (ref 0–1)
BUN SERPL-MCNC: 10 MG/DL (ref 7–20)
CALCIUM SERPL-MCNC: 9.9 MG/DL (ref 8.3–10.6)
CHLORIDE SERPL-SCNC: 104 MMOL/L (ref 99–110)
CHOLEST SERPL-MCNC: 179 MG/DL (ref 0–199)
CO2 SERPL-SCNC: 27 MMOL/L (ref 21–32)
CREAT SERPL-MCNC: 0.7 MG/DL (ref 0.6–1.2)
DEPRECATED RDW RBC AUTO: 13.4 % (ref 12.4–15.4)
EOSINOPHIL # BLD: 0.1 K/UL (ref 0–0.6)
EOSINOPHIL NFR BLD: 1.1 %
GFR SERPLBLD CREATININE-BSD FMLA CKD-EPI: >90 ML/MIN/{1.73_M2}
GLUCOSE SERPL-MCNC: 86 MG/DL (ref 70–99)
HCT VFR BLD AUTO: 43.9 % (ref 36–48)
HDLC SERPL-MCNC: 50 MG/DL (ref 40–60)
HGB BLD-MCNC: 14.9 G/DL (ref 12–16)
LDL CHOLESTEROL: 101 MG/DL
LYMPHOCYTES # BLD: 1.8 K/UL (ref 1–5.1)
LYMPHOCYTES NFR BLD: 39.9 %
MCH RBC QN AUTO: 30.1 PG (ref 26–34)
MCHC RBC AUTO-ENTMCNC: 33.9 G/DL (ref 31–36)
MCV RBC AUTO: 88.8 FL (ref 80–100)
MONOCYTES # BLD: 0.3 K/UL (ref 0–1.3)
MONOCYTES NFR BLD: 7.5 %
NEUTROPHILS # BLD: 2.4 K/UL (ref 1.7–7.7)
NEUTROPHILS NFR BLD: 50.7 %
PLATELET # BLD AUTO: 223 K/UL (ref 135–450)
PMV BLD AUTO: 9.3 FL (ref 5–10.5)
POTASSIUM SERPL-SCNC: 4.3 MMOL/L (ref 3.5–5.1)
PROT SERPL-MCNC: 7.1 G/DL (ref 6.4–8.2)
RBC # BLD AUTO: 4.94 M/UL (ref 4–5.2)
SODIUM SERPL-SCNC: 142 MMOL/L (ref 136–145)
TRIGL SERPL-MCNC: 140 MG/DL (ref 0–150)
VLDLC SERPL CALC-MCNC: 28 MG/DL
WBC # BLD AUTO: 4.6 K/UL (ref 4–11)

## 2025-08-22 RX ORDER — TRIAMCINOLONE ACETONIDE 1 MG/G
CREAM TOPICAL 2 TIMES DAILY PRN
COMMUNITY

## 2025-08-22 ASSESSMENT — VISUAL ACUITY
OS_CC: 20/30
OD_CC: 20/20

## 2025-08-24 PROBLEM — Z98.890 S/P COLONOSCOPY: Status: ACTIVE | Noted: 2025-08-24
